# Patient Record
Sex: FEMALE | Race: BLACK OR AFRICAN AMERICAN | NOT HISPANIC OR LATINO | Employment: OTHER | URBAN - METROPOLITAN AREA
[De-identification: names, ages, dates, MRNs, and addresses within clinical notes are randomized per-mention and may not be internally consistent; named-entity substitution may affect disease eponyms.]

---

## 2017-01-20 ENCOUNTER — TELEPHONE (OUTPATIENT)
Dept: GASTROENTEROLOGY | Facility: CLINIC | Age: 67
End: 2017-01-20

## 2017-01-20 NOTE — TELEPHONE ENCOUNTER
----- Message from Katherine Munoz sent at 1/19/2017  2:10 PM CST -----  Contact: Self   Patient calling per recall letter to have procedure scheduled. Please call patient back at [phone]. Thank you!

## 2017-01-25 ENCOUNTER — TELEPHONE (OUTPATIENT)
Dept: GASTROENTEROLOGY | Facility: CLINIC | Age: 67
End: 2017-01-25

## 2017-01-25 NOTE — TELEPHONE ENCOUNTER
----- Message from Sophie Arreola sent at 1/25/2017  9:23 AM CST -----  Patient is requesting to schedule her EGD and colonoscopy contact patient [phone]    Rebel y

## 2017-01-25 NOTE — TELEPHONE ENCOUNTER
S/w pt she is having difficulty swallowing. She is asking for an EGD to be dilated. Pt has been set up for 2/9. Reviewed instructions with pt. She is aware I will be mailing instructions and confirmation letter.

## 2017-02-07 RX ORDER — CHOLECALCIFEROL (VITAMIN D3) 25 MCG
185 TABLET ORAL DAILY
COMMUNITY

## 2017-02-09 ENCOUNTER — ANESTHESIA EVENT (OUTPATIENT)
Dept: ENDOSCOPY | Facility: HOSPITAL | Age: 67
End: 2017-02-09
Payer: MEDICARE

## 2017-02-09 ENCOUNTER — SURGERY (OUTPATIENT)
Age: 67
End: 2017-02-09

## 2017-02-09 ENCOUNTER — HOSPITAL ENCOUNTER (OUTPATIENT)
Facility: HOSPITAL | Age: 67
Discharge: HOME | End: 2017-02-09
Attending: INTERNAL MEDICINE | Admitting: INTERNAL MEDICINE
Payer: MEDICARE

## 2017-02-09 ENCOUNTER — ANESTHESIA (OUTPATIENT)
Dept: ENDOSCOPY | Facility: HOSPITAL | Age: 67
End: 2017-02-09
Payer: MEDICARE

## 2017-02-09 VITALS
DIASTOLIC BLOOD PRESSURE: 69 MMHG | SYSTOLIC BLOOD PRESSURE: 136 MMHG | WEIGHT: 178 LBS | RESPIRATION RATE: 16 BRPM | TEMPERATURE: 98 F | BODY MASS INDEX: 28.61 KG/M2 | HEIGHT: 66 IN | HEART RATE: 66 BPM | OXYGEN SATURATION: 97 %

## 2017-02-09 VITALS — RESPIRATION RATE: 14 BRPM

## 2017-02-09 DIAGNOSIS — R13.10 DYSPHAGIA: ICD-10-CM

## 2017-02-09 LAB — GLUCOSE SERPL-MCNC: 132 MG/DL (ref 70–110)

## 2017-02-09 PROCEDURE — 82962 GLUCOSE BLOOD TEST: CPT | Mod: PO | Performed by: INTERNAL MEDICINE

## 2017-02-09 PROCEDURE — 43235 EGD DIAGNOSTIC BRUSH WASH: CPT | Mod: PO | Performed by: INTERNAL MEDICINE

## 2017-02-09 PROCEDURE — 25000003 PHARM REV CODE 250: Mod: PO | Performed by: NURSE ANESTHETIST, CERTIFIED REGISTERED

## 2017-02-09 PROCEDURE — 37000008 HC ANESTHESIA 1ST 15 MINUTES: Mod: PO | Performed by: INTERNAL MEDICINE

## 2017-02-09 PROCEDURE — D9220A PRA ANESTHESIA: Mod: ANES,,, | Performed by: ANESTHESIOLOGY

## 2017-02-09 PROCEDURE — 43248 EGD GUIDE WIRE INSERTION: CPT | Mod: ,,, | Performed by: INTERNAL MEDICINE

## 2017-02-09 PROCEDURE — D9220A PRA ANESTHESIA: Mod: CRNA,,, | Performed by: NURSE ANESTHETIST, CERTIFIED REGISTERED

## 2017-02-09 PROCEDURE — 37000009 HC ANESTHESIA EA ADD 15 MINS: Mod: PO | Performed by: INTERNAL MEDICINE

## 2017-02-09 PROCEDURE — 63600175 PHARM REV CODE 636 W HCPCS: Mod: PO | Performed by: NURSE ANESTHETIST, CERTIFIED REGISTERED

## 2017-02-09 PROCEDURE — 43248 EGD GUIDE WIRE INSERTION: CPT | Mod: PO | Performed by: INTERNAL MEDICINE

## 2017-02-09 PROCEDURE — 25000003 PHARM REV CODE 250: Mod: PO | Performed by: INTERNAL MEDICINE

## 2017-02-09 RX ORDER — LIDOCAINE HCL/PF 100 MG/5ML
SYRINGE (ML) INTRAVENOUS
Status: DISCONTINUED | OUTPATIENT
Start: 2017-02-09 | End: 2017-02-09

## 2017-02-09 RX ORDER — SODIUM CHLORIDE 0.9 % (FLUSH) 0.9 %
3 SYRINGE (ML) INJECTION
Status: DISCONTINUED | OUTPATIENT
Start: 2017-02-09 | End: 2017-02-09 | Stop reason: HOSPADM

## 2017-02-09 RX ORDER — PROPOFOL 10 MG/ML
VIAL (ML) INTRAVENOUS
Status: DISCONTINUED | OUTPATIENT
Start: 2017-02-09 | End: 2017-02-09

## 2017-02-09 RX ORDER — SODIUM CHLORIDE, SODIUM LACTATE, POTASSIUM CHLORIDE, CALCIUM CHLORIDE 600; 310; 30; 20 MG/100ML; MG/100ML; MG/100ML; MG/100ML
INJECTION, SOLUTION INTRAVENOUS CONTINUOUS
Status: DISCONTINUED | OUTPATIENT
Start: 2017-02-09 | End: 2017-02-09 | Stop reason: HOSPADM

## 2017-02-09 RX ADMIN — PROPOFOL 25 MG: 10 INJECTION, EMULSION INTRAVENOUS at 09:02

## 2017-02-09 RX ADMIN — SODIUM CHLORIDE, SODIUM LACTATE, POTASSIUM CHLORIDE, AND CALCIUM CHLORIDE: .6; .31; .03; .02 INJECTION, SOLUTION INTRAVENOUS at 09:02

## 2017-02-09 RX ADMIN — PROPOFOL 75 MG: 10 INJECTION, EMULSION INTRAVENOUS at 09:02

## 2017-02-09 RX ADMIN — LIDOCAINE HYDROCHLORIDE 100 MG: 20 INJECTION, SOLUTION INTRAVENOUS at 09:02

## 2017-02-09 NOTE — PLAN OF CARE
Problem: Patient Care Overview  Goal: Plan of Care Review  Outcome: Outcome(s) achieved Date Met:  02/09/17  .Vss, chloe po fluids, adequate pain control, ambulates easily.  States ready to go home.  Discharged from facility with family.

## 2017-02-09 NOTE — DISCHARGE INSTRUCTIONS
Procedural Sedation (Adult)  You have been given medicine by vein to make you sleep during your surgery. This may have included both a pain medicine and sleeping medicine. Most of the effects have worn off. But you may still have some drowsiness for the next 6 to 8 hours.  Home care  Follow these guidelines when you get home:  · For the next 8 hours, you should be watched by a responsible adult. This person should make sure your condition is not getting worse.  · Don't take any medicine by mouth for pain or for sleep during the next 4 hours. These might react with the medicines you were given in the hospital. This could cause a much stronger response than usual.  · Don't drink any alcohol for the next 24 hours.  · Don't drive, operate dangerous machinery, or make important business or personal decisions during the next 24 hours.  Follow-up care  Follow up with your healthcare provider if you are not alert and back to your usual level of activity within 12 hours.  When to seek medical advice  Call your healthcare provider right away if any of these occur:  · Drowsiness gets worse  · Weakness or dizziness gets worse  · Repeated vomiting  · You cannot be awakened   Date Last Reviewed: 10/18/2016  © 0190-0705 [employer]. [address] [place] [address]. All rights reserved. This information is not intended as a substitute for professional medical care. Always follow your healthcare professional's instructions.            Esophageal Dilation     A balloon dilator may be used to widen a stricture in the esophagus.   An esophageal dilation is a procedure used to widen a narrowed section of your esophagus. This is the tube that leads from your throat to your stomach. Narrowing (stricture) of the esophagus can cause problems. These include trouble swallowing. This sheet explains what to expect with esophageal dilation.  Why esophageal dilation is needed  Several problems can be treated  with esophageal dilation. They include:  · Peptic stricture. This is caused by reflux esophagitis. With this problem, the esophagus is irritated by acid reflux (heartburn). This occurs when acid from your stomach flows back up into the esophagus. Stomach acid damages the lining of the esophagus. This leads to a buildup of scar tissue. As a result, the esophagus is narrowed.  · Schatzkis ring. This is an abnormal ring of tissue. It forms where the esophagus meets the stomach. It can cause trouble swallowing. It can also cause food to get stuck in the esophagus. The cause of this condition is not known.  · Achalasia. This condition stops food and liquids from moving into your stomach from the esophagus. It affects the lower esophageal sphincter (LES). The LES is a muscular ring that opens (relaxes) when you swallow. With achalasia, the LES does not relax. This condition can also cause problems with peristalsis. This is the normal muscular action of the esophagus that moves food into the stomach.  · Eosinophilic esophagitis. This is a redness and swelling (inflammation) in the esophagus. It is caused by an environmental trigger such as a food allergy. It can lead to pain, trouble swallowing, and strictures.  · Less common causes of stricture. Other causes of stricture include radiation treatment and cancer.  Before you have esophageal dilation  · Tell your provider about any medicines you take. This includes prescription medicines, over-the-counter medicines, herbs, vitamins, and other supplements. Be sure to mention aspirin or any blood thinners youre taking.  · Let your provider know if you need to take antibiotics before dental procedures. You may need to take them before esophageal dilation as well.  · Tell your provider about any health conditions you have, such as heart or lung disease. Also mention any allergies to medicines.  · Youll need to have an empty stomach for the procedure. Follow your providers  instructions for not eating and drinking before the procedure.  · Arrange to have a family member or friend drive you home after the procedure.  During the procedure  · You may be given local anesthesia to numb your throat. Youll also likely be given medicine to relax you. The procedure takes about 15 minutes. It does not cause trouble breathing.  · A tube called an endoscope (scope) is used. This is a narrow tube with a tiny light and camera at the end. The scope is inserted through your mouth and into your esophagus. It lets your provider see inside your esophagus. To help guide your provider, an imaging method called fluoroscopy may also be used. This creates a moving X-ray image on a computer screen.   · Next, special tiny tools are carefully guided through your mouth and down into the esophagus. They widen the stricture and are then removed. Different types of instruments are used. The type used depends on the size and cause of the stricture. Types include:  ¨ Balloon dilator. A tiny empty balloon is put into the stricture using an endoscope. The balloon is slowly filled with air. The air is removed from the balloon when the stricture is widened to the right size. Balloon dilators are used to treat many types of strictures.  ¨ Guided wire dilator. A thin wire is eased down the esophagus. A small tube thats wider on one end is guided down the wire. It is put into the stricture to stretch it. These dilators are used to treat all kinds of strictures.  ¨ Bougies. These are weighted, cone-shaped tubes. Starting with smaller cones, your provider uses increasingly larger cones until the stricture is stretched the right amount. Bougies are often used to treat strictures that are simple (short, straight, and not very narrow).  After the procedure  · Youll be watched closely until your provider says youre ready to go home. Youll need to have a friend or family member drive you home.  · You may have a sore throat for  the rest of the day.  · You may have pain behind your breastbone for a short time afterwards.  · You can start drinking fluids again after the numbness in your throat goes away. You can resume eating the same day or the next day.  Risks and possible complications  Risks and possible complications for esophageal dilation include:  · Infection  · A tear or hole in the esophagus lining, causing bleeding and possibly needing surgery to fix  · Risks of anesthesia  Follow-up  You may need to have the procedure repeated one or more times. This depends on the cause and extent of the narrowing. Repeat procedures can allow the dilation to take place more slowly. This reduces the risks of the procedure.  If your stricture was caused by reflux esophagitis, youll likely need to take medicine to treat that condition. Your provider will tell you more.  When to call your provider  Call your healthcare provider right away if you have any of the following after the procedure:  · Fever of 100.4°F (38.0°C)  · Chest pain  · Trouble swallowing  · Vomiting blood or material that looks like coffee grounds  · Bleeding  · Black, tarry, or bloody stools   Date Last Reviewed: 7/1/2016  © 8733-2307 [employer]. [address] [place] [address], [place] [address]. All rights reserved. This information is not intended as a substitute for professional medical care. Always follow your healthcare professional's instructions.

## 2017-02-09 NOTE — ANESTHESIA POSTPROCEDURE EVALUATION
"Anesthesia Post Evaluation    Patient: Estela Pardo    Procedure(s) Performed: Procedure(s) (LRB):  ESOPHAGOGASTRODUODENOSCOPY (EGD) (N/A)    Final Anesthesia Type: general  Patient location during evaluation: PACU  Patient participation: Yes- Able to Participate  Level of consciousness: awake and alert and oriented  Post-procedure vital signs: reviewed and stable  Pain management: adequate  Airway patency: patent  PONV status at discharge: No PONV  Anesthetic complications: no      Cardiovascular status: hemodynamically stable and blood pressure returned to baseline  Respiratory status: unassisted, spontaneous ventilation and room air  Hydration status: euvolemic  Follow-up not needed.        Visit Vitals    /72 (BP Location: Left arm, Patient Position: Lying, BP Method: Automatic)    Pulse (!) 56    Temp 36.4 °C (97.5 °F)    Resp 16    Ht 5' 6" (1.676 m)    Wt 80.7 kg (178 lb)    SpO2 (!) 94%    Breastfeeding No    BMI 28.73 kg/m2       Pain/Kathy Score: Pain Assessment Performed: Yes (2/9/2017  9:21 AM)  Presence of Pain: complains of pain/discomfort (2/9/2017  9:21 AM)  Kathy Score: 9 (2/9/2017 10:06 AM)      "

## 2017-02-09 NOTE — ANESTHESIA PREPROCEDURE EVALUATION
02/09/2017  Estela Pardo is a 66 y.o., female.    [facility] Anesthesia Evaluation      I have reviewed the Medications.   Steroids Taken In Past Year: Prednisone    Review of Systems  Anesthesia Hx:  No problems with previous Anesthesia   Social:  Non-Smoker, No Alcohol Use    Hematology/Oncology:        Hematology Comments: Sickle cell trait   EENT/Dental:   Sjogren syndrome   Cardiovascular:   Hypertension    Pulmonary:  Pulmonary Normal    Renal/:  Renal/ Normal     Hepatic/GI:  Hepatic/GI Normal    Musculoskeletal:   Arthritis (rheumatoid arthritis)     Neurological:  Neurology Normal    Endocrine:   Diabetes        Physical Exam  General:  Well nourished    Airway/Jaw/Neck:  Airway Findings: Mouth Opening: Normal General Airway Assessment: Adult  Mallampati: II  Jaw/Neck Findings:  Neck ROM: Extension Decreased, Mild       Chest/Lungs:  Chest/Lungs Findings: Clear to auscultation, Normal Respiratory Rate     Heart/Vascular:  Heart Findings: Rate: Normal  Rhythm: Regular Rhythm  Sounds: Normal  Heart murmur: negative Vascular Findings: Normal (No carotid bruits.)       Mental Status:  Mental Status Findings:  Cooperative, Alert and Oriented         Anesthesia Plan  Type of Anesthesia, risks & benefits discussed:  Anesthesia Type:  general  Patient's Preference:   Intra-op Monitoring Plan:   Intra-op Monitoring Plan Comments:   Post Op Pain Control Plan:   Post Op Pain Control Plan Comments:   Induction:   IV  Beta Blocker:  Patient is not currently on a Beta-Blocker (No further documentation required).       Informed Consent: Patient understands risks and agrees with Anesthesia plan.  Questions answered. Anesthesia consent signed with patient.  ASA Score: 3     Day of Surgery Review of History & Physical:        Anesthesia Plan Notes: Propofol general.        Ready For Surgery From Anesthesia Perspective.

## 2017-02-09 NOTE — H&P
History & Physical - Short Stay  Gastroenterology      SUBJECTIVE:     Procedure: EGD    Chief Complaint/Indication for Procedure: Dysphagia    PTA Medications   Medication Sig    abatacept (ORENCIA) 125 mg/mL Syrg Inject 125 mg into the skin every 7 days.    aspirin 81 mg Tab Take 1 tablet by mouth once daily. Every day    bifidobacterium infantis (ALIGN) 4 mg Cap Take by mouth.    biotin 300 mcg Tab Take 1 tablet by mouth once daily.      calcium citrate-vitamin D3 315-200 mg (CITRACAL+D) 315-200 mg-unit per tablet Take 1 tablet by mouth 2 (two) times daily.      clonidine (CATAPRES) 0.1 MG tablet Take 0.1 mg by mouth 2 (two) times daily.    dexlansoprazole (DEXILANT) 60 mg capsule Take 1 capsule by mouth Daily.    diltiazem (CARDIZEM LA) 360 mg 24 hr tablet Take 360 mg by mouth once daily. Every day    duloxetine (CYMBALTA) 60 MG capsule Take 60 mg by mouth once daily.      fish oil-omega-3 fatty acids 300-1,000 mg capsule Take 2 g by mouth once daily.      gabapentin (NEURONTIN) 300 MG capsule Take 600 mg by mouth 2 (two) times daily. B.I.D. and two at bedtime    garlic 1 mg Cap Take 1 tablet by mouth once daily.     hydrALAZINE (APRESOLINE) 25 MG tablet Take 25 mg by mouth 3 (three) times daily.    levothyroxine (SYNTHROID) 50 MCG tablet Take 50 mcg by mouth once daily.      metformin (GLUCOPHAGE) 500 MG tablet Take 500 mg by mouth daily with breakfast.     oxycodone-acetaminophen (PERCOCET)  mg per tablet Take 1 tablet by mouth daily as needed for Pain.    predniSONE (DELTASONE) 5 MG tablet 5 mg. Every day    sucralfate (CARAFATE) 100 mg/mL suspension Take 1 g by mouth 4 (four) times daily with meals and nightly.    valsartan-hydrochlorothiazide (DIOVAN-HCT) 160-25 mg per tablet Take 1 tablet by mouth once daily.      vitamin D 1000 units Tab Take 185 mg by mouth once daily.       Review of patient's allergies indicates:   Allergen Reactions    Methotrexate Other (See Comments)      Other reaction(s): abscess under arms/ GI discomfort    Nsaids (non-steroidal anti-inflammatory drug) Other (See Comments)     GI upset        Past Medical History   Diagnosis Date    Anemia      history of sickle cell trait    Anticoagulant long-term use     Arthritis      rheumatoid    Chronic constipation     Colon polyp     Diabetes mellitus     Esophageal stricture     Former smoker     Hypertension     RA (rheumatoid arthritis)     Sjoegren syndrome     Thyroid disease      Past Surgical History   Procedure Laterality Date    Toe surgery       pin little toe right foot    Upper gastrointestinal endoscopy  2015    Colonoscopy  4/9/2012     Dr. Lindsey at [facility] [place] [facility]; internal hemorrhoids, colon polyp removed, repeat in 5 years for surveillance or prn    Tubal ligation      Colonoscopy N/A 2/17/2016     Procedure: COLONOSCOPY;  Surgeon: Bartolo Lindsey MD;  Location: [facility];  Service: Endoscopy;  Laterality: N/A;    Joint replacement       right knee replacement    Joint replacement       Left knee replacement     Eye surgery       cataract      Family History   Problem Relation Age of Onset    Liver cancer Sister      Social History   Substance Use Topics    Smoking status: Former Smoker     Packs/day: 0.20     Years: 10.00     Types: Cigarettes     Quit date: 5/20/1985    Smokeless tobacco: Never Used    Alcohol use No         OBJECTIVE:     Vital Signs (Most Recent)  Temp: 97.9 °F (36.6 °C) (02/09/17 0920)  Pulse: 60 (02/09/17 0920)  Resp: 16 (02/09/17 0920)  BP: (!) 150/68 (02/09/17 0920)  SpO2: 95 % (02/09/17 0920)    Physical Exam:                                                       GENERAL:  Comfortable, in no acute distress.                                 HEENT EXAM:  Nonicteric.  No adenopathy.  Oropharynx is clear.               NECK:  Supple.                                                               LUNGS:  Clear.                                                                CARDIAC:  Regular rate and rhythm.  S1, S2.  No murmur.                      ABDOMEN:  Soft, positive bowel sounds, nontender.  No hepatosplenomegaly or masses.  No rebound or guarding.                                             EXTREMITIES:  No edema.     MENTAL STATUS:  Normal, alert and oriented.      ASSESSMENT/PLAN:     Assessment: Dysphagia    Plan: EGD    Anesthesia Plan: General    ASA Grade: ASA 2 - Patient with mild systemic disease with no functional limitations    MALLAMPATI SCORE:  I (soft palate, uvula, fauces, and tonsillar pillars visible)

## 2017-02-09 NOTE — TRANSFER OF CARE
"Anesthesia Transfer of Care Note    Patient: Estela Pardo    Procedure(s) Performed: Procedure(s) (LRB):  ESOPHAGOGASTRODUODENOSCOPY (EGD) (N/A)    Patient location: PACU    Anesthesia Type: general    Transport from OR: Transported from OR on room air with adequate spontaneous ventilation    Post pain: adequate analgesia    Post assessment: no apparent anesthetic complications and tolerated procedure well    Post vital signs: stable    Level of consciousness: awake and alert    Nausea/Vomiting: no nausea/vomiting    Complications: none          Last vitals:   Visit Vitals    BP (!) 150/68 (BP Location: Right arm, Patient Position: Lying, BP Method: Automatic)    Pulse 60    Temp 36.6 °C (97.9 °F) (Skin)    Resp 16    Ht 5' 6" (1.676 m)    Wt 80.7 kg (178 lb)    SpO2 95%    Breastfeeding No    BMI 28.73 kg/m2     "

## 2017-02-09 NOTE — IP AVS SNAPSHOT
[facility]  [address] [facility] [address]  [place] [address]  Phone: [phone]           Patient Discharge Instructions     Our goal is to set you up for success. This packet includes information on your condition, medications, and your home care. It will help you to care for yourself so you don't get sicker and need to go back to the hospital.     Please ask your nurse if you have any questions.        There are many details to remember when preparing to leave the hospital. Here is what you will need to do:    1. Take your medicine. If you are prescribed medications, review your Medication List in the following pages. You may have new medications to  at the pharmacy and others that you'll need to stop taking. Review the instructions for how and when to take your medications. Talk with your doctor or nurses if you are unsure of what to do.     2. Go to your follow-up appointments. Specific follow-up information is listed in the following pages. Your may be contacted by a transition nurse or clinical provider about future appointments. Be sure we have all of the phone numbers to reach you, if needed. Please contact your provider's office if you are unable to make an appointment.     3. Watch for warning signs. Your doctor or nurse will give you detailed warning signs to watch for and when to call for assistance. These instructions may also include educational information about your condition. If you experience any of warning signs to your health, call your doctor.               [facility]  Unless otherwise directed by your provider, please contact [facility], our nurse care line that is available for 24/7 assistance.     [phone] (toll-free)    Registered nurses in the [facility] provide clinical advisement, health education, appointment booking, and other advisory services.                    ** Verify the list of medication(s) below is accurate and up  to date. Carry this with you in case of emergency. If your medications have changed, please notify your healthcare provider.             Medication List      CONTINUE taking these medications        Additional Info                      ALIGN 4 mg Cap   Refills:  0   Generic drug:  Bifidobacterium infantis    Instructions:  Take by mouth.     Begin Date    AM    Noon    PM    Bedtime       aspirin 81 mg Tab   Refills:  0   Dose:  1 tablet    Instructions:  Take 1 tablet by mouth once daily. Every day     Begin Date    AM    Noon    PM    Bedtime       biotin 300 mcg Tab   Refills:  0   Dose:  1 tablet    Instructions:  Take 1 tablet by mouth once daily.     Begin Date    AM    Noon    PM    Bedtime       calcium citrate-vitamin D3 315-200 mg 315-200 mg-unit per tablet   Commonly known as:  CITRACAL+D   Refills:  0   Dose:  1 tablet    Instructions:  Take 1 tablet by mouth 2 (two) times daily.     Begin Date    AM    Noon    PM    Bedtime       CARDIZEM  mg 24 hr tablet   Refills:  0   Dose:  360 mg   Generic drug:  diltiaZEM    Instructions:  Take 360 mg by mouth once daily. Every day     Begin Date    AM    Noon    PM    Bedtime       cloNIDine 0.1 MG tablet   Commonly known as:  CATAPRES   Refills:  0   Dose:  0.1 mg    Instructions:  Take 0.1 mg by mouth 2 (two) times daily.     Begin Date    AM    Noon    PM    Bedtime       DEXILANT 60 mg capsule   Refills:  0   Dose:  1 capsule   Generic drug:  dexlansoprazole    Instructions:  Take 1 capsule by mouth Daily.     Begin Date    AM    Noon    PM    Bedtime       duloxetine 60 MG capsule   Commonly known as:  CYMBALTA   Refills:  0   Dose:  60 mg    Instructions:  Take 60 mg by mouth once daily.     Begin Date    AM    Noon    PM    Bedtime       fish oil-omega-3 fatty acids 300-1,000 mg capsule   Refills:  0   Dose:  2 g    Instructions:  Take 2 g by mouth once daily.     Begin Date    AM    Noon    PM    Bedtime       garlic 1 mg Cap   Refills:  0   Dose:   1 tablet    Instructions:  Take 1 tablet by mouth once daily.     Begin Date    AM    Noon    PM    Bedtime       hydrALAZINE 25 MG tablet   Commonly known as:  APRESOLINE   Refills:  0   Dose:  25 mg    Instructions:  Take 25 mg by mouth 3 (three) times daily.     Begin Date    AM    Noon    PM    Bedtime       levothyroxine 50 MCG tablet   Commonly known as:  SYNTHROID   Refills:  0   Dose:  50 mcg    Instructions:  Take 50 mcg by mouth once daily.     Begin Date    AM    Noon    PM    Bedtime       metformin 500 MG tablet   Commonly known as:  GLUCOPHAGE   Refills:  0   Dose:  500 mg    Instructions:  Take 500 mg by mouth daily with breakfast.     Begin Date    AM    Noon    PM    Bedtime       NEURONTIN 300 MG capsule   Refills:  0   Dose:  600 mg   Generic drug:  gabapentin    Instructions:  Take 600 mg by mouth 2 (two) times daily. B.I.D. and two at bedtime     Begin Date    AM    Noon    PM    Bedtime       ORENCIA 125 mg/mL Syrg   Refills:  0   Dose:  125 mg   Indications:  Rheumatoid Arthritis   Generic drug:  abatacept    Instructions:  Inject 125 mg into the skin every 7 days.     Begin Date    AM    Noon    PM    Bedtime       oxycodone-acetaminophen  mg per tablet   Commonly known as:  PERCOCET   Refills:  0   Dose:  1 tablet    Instructions:  Take 1 tablet by mouth daily as needed for Pain.     Begin Date    AM    Noon    PM    Bedtime       predniSONE 5 MG tablet   Commonly known as:  DELTASONE   Refills:  0   Dose:  5 mg    Instructions:  5 mg. Every day     Begin Date    AM    Noon    PM    Bedtime       sucralfate 100 mg/mL suspension   Commonly known as:  CARAFATE   Refills:  0   Dose:  1 g    Instructions:  Take 1 g by mouth 4 (four) times daily with meals and nightly.     Begin Date    AM    Noon    PM    Bedtime       valsartan-hydrochlorothiazide 160-25 mg per tablet   Commonly known as:  DIOVAN-HCT   Refills:  0   Dose:  1 tablet    Instructions:  Take 1 tablet by mouth once daily.      Begin Date    AM    Noon    PM    Bedtime       vitamin D 1000 units Tab   Refills:  0   Dose:  185 mg    Instructions:  Take 185 mg by mouth once daily.     Begin Date    AM    Noon    PM    Bedtime                  Please bring to all follow up appointments:    1. A copy of your discharge instructions.  2. All medicines you are currently taking in their original bottles.  3. Identification and insurance card.    Please arrive 15 minutes ahead of scheduled appointment time.    Please call 24 hours in advance if you must reschedule your appointment and/or time.        Follow-up Information     Follow up with Suyapa Rasmussen MD.    Specialty:  Internal Medicine    Contact information:    [address]  [address]  [facility] [address]  [phone]            Discharge Instructions           Procedural Sedation (Adult)  You have been given medicine by vein to make you sleep during your surgery. This may have included both a pain medicine and sleeping medicine. Most of the effects have worn off. But you may still have some drowsiness for the next 6 to 8 hours.  Home care  Follow these guidelines when you get home:  · For the next 8 hours, you should be watched by a responsible adult. This person should make sure your condition is not getting worse.  · Don't take any medicine by mouth for pain or for sleep during the next 4 hours. These might react with the medicines you were given in the hospital. This could cause a much stronger response than usual.  · Don't drink any alcohol for the next 24 hours.  · Don't drive, operate dangerous machinery, or make important business or personal decisions during the next 24 hours.  Follow-up care  Follow up with your healthcare provider if you are not alert and back to your usual level of activity within 12 hours.  When to seek medical advice  Call your healthcare provider right away if any of these occur:  · Drowsiness gets worse  · Weakness or dizziness gets worse  · Repeated  vomiting  · You cannot be awakened   Date Last Reviewed: 10/18/2016  © 2853-2821 [employer]. [address] [place] [address], [place] [address]. All rights reserved. This information is not intended as a substitute for professional medical care. Always follow your healthcare professional's instructions.            Esophageal Dilation     A balloon dilator may be used to widen a stricture in the esophagus.   An esophageal dilation is a procedure used to widen a narrowed section of your esophagus. This is the tube that leads from your throat to your stomach. Narrowing (stricture) of the esophagus can cause problems. These include trouble swallowing. This sheet explains what to expect with esophageal dilation.  Why esophageal dilation is needed  Several problems can be treated with esophageal dilation. They include:  · Peptic stricture. This is caused by reflux esophagitis. With this problem, the esophagus is irritated by acid reflux (heartburn). This occurs when acid from your stomach flows back up into the esophagus. Stomach acid damages the lining of the esophagus. This leads to a buildup of scar tissue. As a result, the esophagus is narrowed.  · Schatzkis ring. This is an abnormal ring of tissue. It forms where the esophagus meets the stomach. It can cause trouble swallowing. It can also cause food to get stuck in the esophagus. The cause of this condition is not known.  · Achalasia. This condition stops food and liquids from moving into your stomach from the esophagus. It affects the lower esophageal sphincter (LES). The LES is a muscular ring that opens (relaxes) when you swallow. With achalasia, the LES does not relax. This condition can also cause problems with peristalsis. This is the normal muscular action of the esophagus that moves food into the stomach.  · Eosinophilic esophagitis. This is a redness and swelling (inflammation) in the esophagus. It is caused by an environmental trigger such as a food  allergy. It can lead to pain, trouble swallowing, and strictures.  · Less common causes of stricture. Other causes of stricture include radiation treatment and cancer.  Before you have esophageal dilation  · Tell your provider about any medicines you take. This includes prescription medicines, over-the-counter medicines, herbs, vitamins, and other supplements. Be sure to mention aspirin or any blood thinners youre taking.  · Let your provider know if you need to take antibiotics before dental procedures. You may need to take them before esophageal dilation as well.  · Tell your provider about any health conditions you have, such as heart or lung disease. Also mention any allergies to medicines.  · Youll need to have an empty stomach for the procedure. Follow your providers instructions for not eating and drinking before the procedure.  · Arrange to have a family member or friend drive you home after the procedure.  During the procedure  · You may be given local anesthesia to numb your throat. Youll also likely be given medicine to relax you. The procedure takes about 15 minutes. It does not cause trouble breathing.  · A tube called an endoscope (scope) is used. This is a narrow tube with a tiny light and camera at the end. The scope is inserted through your mouth and into your esophagus. It lets your provider see inside your esophagus. To help guide your provider, an imaging method called fluoroscopy may also be used. This creates a moving X-ray image on a computer screen.   · Next, special tiny tools are carefully guided through your mouth and down into the esophagus. They widen the stricture and are then removed. Different types of instruments are used. The type used depends on the size and cause of the stricture. Types include:  ¨ Balloon dilator. A tiny empty balloon is put into the stricture using an endoscope. The balloon is slowly filled with air. The air is removed from the balloon when the stricture is  widened to the right size. Balloon dilators are used to treat many types of strictures.  ¨ Guided wire dilator. A thin wire is eased down the esophagus. A small tube thats wider on one end is guided down the wire. It is put into the stricture to stretch it. These dilators are used to treat all kinds of strictures.  ¨ Bougies. These are weighted, cone-shaped tubes. Starting with smaller cones, your provider uses increasingly larger cones until the stricture is stretched the right amount. Bougies are often used to treat strictures that are simple (short, straight, and not very narrow).  After the procedure  · Youll be watched closely until your provider says youre ready to go home. Youll need to have a friend or family member drive you home.  · You may have a sore throat for the rest of the day.  · You may have pain behind your breastbone for a short time afterwards.  · You can start drinking fluids again after the numbness in your throat goes away. You can resume eating the same day or the next day.  Risks and possible complications  Risks and possible complications for esophageal dilation include:  · Infection  · A tear or hole in the esophagus lining, causing bleeding and possibly needing surgery to fix  · Risks of anesthesia  Follow-up  You may need to have the procedure repeated one or more times. This depends on the cause and extent of the narrowing. Repeat procedures can allow the dilation to take place more slowly. This reduces the risks of the procedure.  If your stricture was caused by reflux esophagitis, youll likely need to take medicine to treat that condition. Your provider will tell you more.  When to call your provider  Call your healthcare provider right away if you have any of the following after the procedure:  · Fever of 100.4°F (38.0°C)  · Chest pain  · Trouble swallowing  · Vomiting blood or material that looks like coffee grounds  · Bleeding  · Black, tarry, or bloody stools   Date Last  "Reviewed: 7/1/2016  © 9679-7129 [employer]. [address] [place] [address], [place] [address]. All rights reserved. This information is not intended as a substitute for professional medical care. Always follow your healthcare professional's instructions.            Admission Information     Date & Time Provider Department CSN    2/9/2017  8:07 AM Bartolo Lindsey MD [facility] [id number]      Care Providers     Provider Role Specialty Primary office phone    Bartolo Lindsey MD Attending Provider Gastroenterology [phone]    Bartolo Lindsey MD Surgeon  Gastroenterology [phone]      Your Vitals Were     BP Pulse Temp Resp Height Weight    136/72 (BP Location: Left arm, Patient Position: Lying, BP Method: Automatic) 56 97.5 °F (36.4 °C) 16 5' 6" (1.676 m) 80.7 kg (178 lb)    SpO2 BMI             94% 28.73 kg/m2         Recent Lab Values     No lab values to display.      Allergies as of 2/9/2017        Reactions    Methotrexate Other (See Comments)    Other reaction(s): abscess under arms/ GI discomfort    Nsaids (Non-steroidal Anti-inflammatory Drug) Other (See Comments)    GI upset      Advance Directives     An advance directive is a document which, in the event you are no longer able to make decisions for yourself, tells your healthcare team what kind of treatment you do or do not want to receive, or who you would like to make those decisions for you.  If you do not currently have an advance directive, [facility] encourages you to create one.  For more information call:  [phone]), [phone] ([phone]),  or log on to [email or web address].        Smoking Cessation     If you would like to quit smoking:   You may be eligible for free services if you are a [place] resident and started smoking cigarettes before September 1, 1988.  Call the Smoking Cessation Trust (SCT) toll free at (425) 042-1706 or (161) 303-3649.   Call 1-800-QUIT-NOW if you " do not meet the above criteria.            Language Assistance Services     ATTENTION: Language assistance services are available, free of charge. Please call [phone].      ATENCIÓN: Si matilde damon, tiene a rivera disposición servicios gratuitos de asistencia lingüística. Jemma al [phone].     CHÚ Ý: N?u b?n nói Ti?ng Vi?t, có các d?ch v? h? tr? ngôn ng? mi?n phí dành cho b?n. G?i s? [phone].        [facility] Sign-Up     Activating your [facility] account is as easy as 1-2-3!     1) Visit [email or web address], select Sign Up Now, enter this activation code and your date of birth, then select Next.  [email or web address]  Expires: 3/26/2017 10:15 AM      2) Create a username and password to use when you visit [facility] in the future and select a security question in case you lose your password and select Next.    3) Enter your e-mail address and click Sign Up!    Additional Information  If you have questions, please e-mail [email or web address] or call [phone] to talk to our [facility] staff. Remember, [facility] is NOT to be used for urgent needs. For medical emergencies, dial 911.          [facility] complies with applicable Federal civil rights laws and does not discriminate on the basis of race, color, national origin, age, disability, or sex.

## 2017-02-09 NOTE — DISCHARGE SUMMARY
Discharge Note  Short Stay      SUMMARY     Admit Date: 2/9/2017    Attending Physician: Bartolo Lindsey MD     Discharge Physician: Bartolo Lindsey MD    Discharge Date: 2/9/2017 9:54 AM    Final Diagnosis: Dysphagia, unspecified type [R13.10]    Disposition: HOME OR SELF CARE    Patient Instructions:   Current Discharge Medication List      CONTINUE these medications which have NOT CHANGED    Details   abatacept (ORENCIA) 125 mg/mL Syrg Inject 125 mg into the skin every 7 days.      aspirin 81 mg Tab Take 1 tablet by mouth once daily. Every day      bifidobacterium infantis (ALIGN) 4 mg Cap Take by mouth.      biotin 300 mcg Tab Take 1 tablet by mouth once daily.        calcium citrate-vitamin D3 315-200 mg (CITRACAL+D) 315-200 mg-unit per tablet Take 1 tablet by mouth 2 (two) times daily.        clonidine (CATAPRES) 0.1 MG tablet Take 0.1 mg by mouth 2 (two) times daily.      dexlansoprazole (DEXILANT) 60 mg capsule Take 1 capsule by mouth Daily.      diltiazem (CARDIZEM LA) 360 mg 24 hr tablet Take 360 mg by mouth once daily. Every day      duloxetine (CYMBALTA) 60 MG capsule Take 60 mg by mouth once daily.        fish oil-omega-3 fatty acids 300-1,000 mg capsule Take 2 g by mouth once daily.        gabapentin (NEURONTIN) 300 MG capsule Take 600 mg by mouth 2 (two) times daily. B.I.D. and two at bedtime      garlic 1 mg Cap Take 1 tablet by mouth once daily.       hydrALAZINE (APRESOLINE) 25 MG tablet Take 25 mg by mouth 3 (three) times daily.      levothyroxine (SYNTHROID) 50 MCG tablet Take 50 mcg by mouth once daily.        metformin (GLUCOPHAGE) 500 MG tablet Take 500 mg by mouth daily with breakfast.       oxycodone-acetaminophen (PERCOCET)  mg per tablet Take 1 tablet by mouth daily as needed for Pain.      predniSONE (DELTASONE) 5 MG tablet 5 mg. Every day      sucralfate (CARAFATE) 100 mg/mL suspension Take 1 g by mouth 4 (four) times daily with meals and nightly.       valsartan-hydrochlorothiazide (DIOVAN-HCT) 160-25 mg per tablet Take 1 tablet by mouth once daily.        vitamin D 1000 units Tab Take 185 mg by mouth once daily.             Discharge Procedure Orders (must include Diet, Follow-up, Activity)    Follow Up:  Follow up with PCP as previously scheduled  Resume routine diet.  Activity as tolerated.    No driving day of procedure.

## 2018-02-07 ENCOUNTER — OFFICE VISIT (OUTPATIENT)
Dept: GASTROENTEROLOGY | Facility: CLINIC | Age: 68
End: 2018-02-07
Payer: MEDICARE

## 2018-02-07 VITALS
DIASTOLIC BLOOD PRESSURE: 83 MMHG | BODY MASS INDEX: 29.41 KG/M2 | HEART RATE: 92 BPM | WEIGHT: 183 LBS | SYSTOLIC BLOOD PRESSURE: 156 MMHG | HEIGHT: 66 IN

## 2018-02-07 DIAGNOSIS — R10.13 EPIGASTRIC PAIN: Primary | ICD-10-CM

## 2018-02-07 DIAGNOSIS — K21.9 GASTROESOPHAGEAL REFLUX DISEASE, ESOPHAGITIS PRESENCE NOT SPECIFIED: ICD-10-CM

## 2018-02-07 DIAGNOSIS — R13.19 ESOPHAGEAL DYSPHAGIA: ICD-10-CM

## 2018-02-07 PROCEDURE — 99213 OFFICE O/P EST LOW 20 MIN: CPT | Mod: PBBFAC,PO | Performed by: INTERNAL MEDICINE

## 2018-02-07 PROCEDURE — 99999 PR PBB SHADOW E&M-EST. PATIENT-LVL III: CPT | Mod: PBBFAC,,, | Performed by: INTERNAL MEDICINE

## 2018-02-07 PROCEDURE — 99213 OFFICE O/P EST LOW 20 MIN: CPT | Mod: ,,, | Performed by: INTERNAL MEDICINE

## 2018-02-07 NOTE — PROGRESS NOTES
"Pt presents for evaluation epigastric "burning" pain. Pain is chronic, intermittent. Pt currently doing OK. No N/V. No bleeding. No fever or jaundice. Appetite and weight stable. Positive dysphagia to solids. Hx esophageal ring s/p dilation. Taking dexilant. Denies NSAIDs. Prior GB U/S approx 5 years ago negative.    REVIEW OF SYSTEMS:   Constitutional: Negative for fever, appetite change and unexpected weight change.  HENT: Negative for sore throat and positive trouble swallowing.  Eyes: Negative for visual disturbance.  Respiratory: Negative for chest tightness, shortness of breath and wheezing.  Cardiovascular: Negative for chest pain.  Gastrointestinal:  as per HPI  Genitourinary: Negative for dysuria, frequency and hematuria.  Musculoskeletal: Negative for myalgias, joint swelling and arthralgias.  Skin: Negative for color change and rash.   Neurological: Negative for syncope, weakness and headaches.    PHYSICAL EXAMINATION:                                                        GENERAL:  Comfortable, in no acute distress.      SKIN: Non-jaundiced.                             HEENT EXAM:  Nonicteric.  No adenopathy.  Oropharynx is clear.               NECK:  Supple.                                                               LUNGS:  Clear.                                                               CARDIAC:  Regular rate and rhythm.  S1, S2.  No murmur.                      ABDOMEN:  Soft, positive bowel sounds, nontender.  No hepatosplenomegaly or masses.  No rebound or guarding.                                             EXTREMITIES:  No edema.     NEURO: CN II-XII intact; motor/sensory non-focal.    IMP: 1. Epigastric pain          2. Dysphagia          3. Hx esophageal ring          4. GERD    PLAN: EGD/dilation.    "

## 2018-02-09 ENCOUNTER — ANESTHESIA (OUTPATIENT)
Dept: ENDOSCOPY | Facility: HOSPITAL | Age: 68
End: 2018-02-09
Payer: MEDICARE

## 2018-02-09 ENCOUNTER — ANESTHESIA EVENT (OUTPATIENT)
Dept: ENDOSCOPY | Facility: HOSPITAL | Age: 68
End: 2018-02-09
Payer: MEDICARE

## 2018-02-09 ENCOUNTER — SURGERY (OUTPATIENT)
Age: 68
End: 2018-02-09

## 2018-02-09 ENCOUNTER — HOSPITAL ENCOUNTER (OUTPATIENT)
Facility: HOSPITAL | Age: 68
Discharge: HOME | End: 2018-02-09
Attending: INTERNAL MEDICINE | Admitting: INTERNAL MEDICINE
Payer: MEDICARE

## 2018-02-09 VITALS
OXYGEN SATURATION: 98 % | RESPIRATION RATE: 16 BRPM | TEMPERATURE: 98 F | HEART RATE: 66 BPM | DIASTOLIC BLOOD PRESSURE: 68 MMHG | SYSTOLIC BLOOD PRESSURE: 135 MMHG

## 2018-02-09 DIAGNOSIS — R10.13 EPIGASTRIC PAIN: ICD-10-CM

## 2018-02-09 LAB
GLUCOSE SERPL-MCNC: 152 MG/DL (ref 70–110)
H PYLORI INDEX VALUE: NEGATIVE

## 2018-02-09 PROCEDURE — 87449 NOS EACH ORGANISM AG IA: CPT | Mod: PO | Performed by: INTERNAL MEDICINE

## 2018-02-09 PROCEDURE — 43248 EGD GUIDE WIRE INSERTION: CPT | Mod: PO | Performed by: INTERNAL MEDICINE

## 2018-02-09 PROCEDURE — 88305 TISSUE EXAM BY PATHOLOGIST: CPT | Mod: 59 | Performed by: PATHOLOGY

## 2018-02-09 PROCEDURE — D9220A PRA ANESTHESIA: Mod: ANES,,, | Performed by: ANESTHESIOLOGY

## 2018-02-09 PROCEDURE — 37000008 HC ANESTHESIA 1ST 15 MINUTES: Mod: PO | Performed by: INTERNAL MEDICINE

## 2018-02-09 PROCEDURE — 88342 IMHCHEM/IMCYTCHM 1ST ANTB: CPT | Mod: 26,,, | Performed by: PATHOLOGY

## 2018-02-09 PROCEDURE — 43248 EGD GUIDE WIRE INSERTION: CPT | Mod: ,,, | Performed by: INTERNAL MEDICINE

## 2018-02-09 PROCEDURE — 63600175 PHARM REV CODE 636 W HCPCS: Mod: PO | Performed by: NURSE ANESTHETIST, CERTIFIED REGISTERED

## 2018-02-09 PROCEDURE — 43239 EGD BIOPSY SINGLE/MULTIPLE: CPT | Mod: PO | Performed by: INTERNAL MEDICINE

## 2018-02-09 PROCEDURE — 27201012 HC FORCEPS, HOT/COLD, DISP: Mod: PO | Performed by: INTERNAL MEDICINE

## 2018-02-09 PROCEDURE — D9220A PRA ANESTHESIA: Mod: CRNA,,, | Performed by: NURSE ANESTHETIST, CERTIFIED REGISTERED

## 2018-02-09 PROCEDURE — 37000009 HC ANESTHESIA EA ADD 15 MINS: Mod: PO | Performed by: INTERNAL MEDICINE

## 2018-02-09 PROCEDURE — 43239 EGD BIOPSY SINGLE/MULTIPLE: CPT | Mod: 59,,, | Performed by: INTERNAL MEDICINE

## 2018-02-09 RX ORDER — PROPOFOL 10 MG/ML
VIAL (ML) INTRAVENOUS
Status: DISCONTINUED | OUTPATIENT
Start: 2018-02-09 | End: 2018-02-09

## 2018-02-09 RX ORDER — LIDOCAINE HCL/PF 100 MG/5ML
SYRINGE (ML) INTRAVENOUS
Status: DISCONTINUED | OUTPATIENT
Start: 2018-02-09 | End: 2018-02-09

## 2018-02-09 RX ORDER — SODIUM CHLORIDE, SODIUM LACTATE, POTASSIUM CHLORIDE, CALCIUM CHLORIDE 600; 310; 30; 20 MG/100ML; MG/100ML; MG/100ML; MG/100ML
INJECTION, SOLUTION INTRAVENOUS CONTINUOUS
Status: DISCONTINUED | OUTPATIENT
Start: 2018-02-09 | End: 2018-02-09 | Stop reason: HOSPADM

## 2018-02-09 RX ADMIN — LIDOCAINE HYDROCHLORIDE 100 MG: 20 INJECTION, SOLUTION INTRAVENOUS at 12:02

## 2018-02-09 RX ADMIN — PROPOFOL 50 MG: 10 INJECTION, EMULSION INTRAVENOUS at 12:02

## 2018-02-09 RX ADMIN — PROPOFOL 100 MG: 10 INJECTION, EMULSION INTRAVENOUS at 12:02

## 2018-02-09 RX ADMIN — SODIUM CHLORIDE, SODIUM LACTATE, POTASSIUM CHLORIDE, CALCIUM CHLORIDE: 600; 310; 30; 20 INJECTION, SOLUTION INTRAVENOUS at 11:02

## 2018-02-09 NOTE — ANESTHESIA PREPROCEDURE EVALUATION
02/09/2018  Estela Pardo is a 67 y.o., female.    Anesthesia Evaluation      I have reviewed the Medications.   Steroids Taken In Past Year: Prednisone    Review of Systems  Anesthesia Hx:  No problems with previous Anesthesia   Social:  Non-Smoker, No Alcohol Use    Hematology/Oncology:        Hematology Comments: Sickle cell trait   EENT/Dental:   Sjogren syndrome   Cardiovascular:   Hypertension    Pulmonary:  Pulmonary Normal    Renal/:  Renal/ Normal     Hepatic/GI:  Hepatic/GI Normal    Musculoskeletal:   Arthritis (rheumatoid arthritis)     Neurological:   Chronic Pain Syndrome   Endocrine:   Diabetes Hypothyroidism        Physical Exam  General:  Well nourished    Airway/Jaw/Neck:  Airway Findings: Mouth Opening: Normal General Airway Assessment: Adult  Mallampati: II  Jaw/Neck Findings:  Neck ROM: Extension Decreased, Mild       Chest/Lungs:  Chest/Lungs Findings: Clear to auscultation, Normal Respiratory Rate     Heart/Vascular:  Heart Findings: Rate: Normal  Rhythm: Regular Rhythm  Sounds: Normal  Heart murmur: negative Vascular Findings: Normal (No carotid bruits.)       Mental Status:  Mental Status Findings:  Cooperative, Alert and Oriented         Anesthesia Plan  Type of Anesthesia, risks & benefits discussed:  Anesthesia Type:  general  Patient's Preference:   Intra-op Monitoring Plan:   Intra-op Monitoring Plan Comments:   Post Op Pain Control Plan:   Post Op Pain Control Plan Comments:   Induction:   IV  Beta Blocker:  Patient is not currently on a Beta-Blocker (No further documentation required).       Informed Consent: Patient understands risks and agrees with Anesthesia plan.  Questions answered. Anesthesia consent signed with patient.  ASA Score: 3     Day of Surgery Review of History & Physical:        Anesthesia Plan Notes: Propofol general.        Ready For Surgery From  Anesthesia Perspective.

## 2018-02-09 NOTE — TRANSFER OF CARE
Anesthesia Transfer of Care Note    Patient: Estela Pardo    Procedure(s) Performed: Procedure(s) (LRB):  ESOPHAGOGASTRODUODENOSCOPY (EGD) (N/A)    Patient location: PACU    Anesthesia Type: general    Transport from OR: Transported from OR on room air with adequate spontaneous ventilation    Post pain: adequate analgesia    Post assessment: no apparent anesthetic complications    Post vital signs: stable    Level of consciousness: awake, alert and oriented    Nausea/Vomiting: no nausea/vomiting    Complications: none    Transfer of care protocol was followed      Last vitals:   Visit Vitals  /62 (BP Location: Right arm, Patient Position: Lying)   Pulse 74   Temp (!) 7.9 °C (46.2 °F)   Resp 18   SpO2 96%   Breastfeeding? No

## 2018-02-09 NOTE — ANESTHESIA POSTPROCEDURE EVALUATION
Anesthesia Post Evaluation    Patient: Estela Pardo    Procedure(s) Performed: Procedure(s) (LRB):  ESOPHAGOGASTRODUODENOSCOPY (EGD) (N/A)    Final Anesthesia Type: general  Patient location during evaluation: PACU  Patient participation: Yes- Able to Participate  Level of consciousness: awake and alert  Post-procedure vital signs: reviewed and stable  Pain management: adequate  Airway patency: patent  PONV status at discharge: No PONV  Anesthetic complications: no      Cardiovascular status: hemodynamically stable  Respiratory status: unassisted and room air  Hydration status: euvolemic  Follow-up not needed.        Visit Vitals  /68 (BP Location: Left arm, Patient Position: Lying)   Pulse 66   Temp 36.7 °C (98 °F) (Skin)   Resp 16   SpO2 98%   Breastfeeding? No       Pain/Kathy Score: Pain Assessment Performed: Yes (2/9/2018 12:41 PM)  Presence of Pain: denies (2/9/2018 12:41 PM)  Kathy Score: 10 (2/9/2018 12:41 PM)

## 2018-02-09 NOTE — H&P
History & Physical - Short Stay  Gastroenterology      SUBJECTIVE:     Procedure: EGD    Chief Complaint/Indication for Procedure: Dysphagia and Epigastric Pain    PTA Medications   Medication Sig    bifidobacterium infantis (ALIGN) 4 mg Cap Take by mouth.    biotin 300 mcg Tab Take 1 tablet by mouth once daily.      calcium citrate-vitamin D3 315-200 mg (CITRACAL+D) 315-200 mg-unit per tablet Take 1 tablet by mouth 2 (two) times daily.      clonidine (CATAPRES) 0.1 MG tablet Take 0.1 mg by mouth 2 (two) times daily.    diltiazem (CARDIZEM LA) 360 mg 24 hr tablet Take 360 mg by mouth once daily. Every day    duloxetine (CYMBALTA) 60 MG capsule Take 60 mg by mouth once daily.      fish oil-omega-3 fatty acids 300-1,000 mg capsule Take 2 g by mouth once daily.      gabapentin (NEURONTIN) 300 MG capsule Take 600 mg by mouth 2 (two) times daily. B.I.D. and two at bedtime    garlic 1 mg Cap Take 1 tablet by mouth once daily.     hydrALAZINE (APRESOLINE) 25 MG tablet Take 25 mg by mouth 3 (three) times daily.    levothyroxine (SYNTHROID) 50 MCG tablet Take 50 mcg by mouth once daily.      metformin (GLUCOPHAGE) 500 MG tablet Take 500 mg by mouth daily with breakfast.     predniSONE (DELTASONE) 5 MG tablet 5 mg. Every day    valsartan-hydrochlorothiazide (DIOVAN-HCT) 160-25 mg per tablet Take 1 tablet by mouth once daily.      vitamin D 1000 units Tab Take 185 mg by mouth once daily.    abatacept (ORENCIA) 125 mg/mL Syrg Inject 125 mg into the skin every 7 days.    aspirin 81 mg Tab Take 1 tablet by mouth once daily. Every day    dexlansoprazole (DEXILANT) 60 mg capsule Take 1 capsule by mouth Daily.    oxycodone-acetaminophen (PERCOCET)  mg per tablet Take 1 tablet by mouth daily as needed for Pain.    sucralfate (CARAFATE) 100 mg/mL suspension Take 1 g by mouth 4 (four) times daily with meals and nightly.       Review of patient's allergies indicates:   Allergen Reactions    Methotrexate Other  (See Comments)     Other reaction(s): abscess under arms/ GI discomfort    Nsaids (non-steroidal anti-inflammatory drug) Other (See Comments)     GI upset        Past Medical History:   Diagnosis Date    Anemia     history of sickle cell trait    Anticoagulant long-term use     Arthritis     rheumatoid    Chronic constipation     Colon polyp     Diabetes mellitus     Esophageal stricture     Former smoker     Hypertension     RA (rheumatoid arthritis)     Sjoegren syndrome     Thyroid disease      Past Surgical History:   Procedure Laterality Date    COLONOSCOPY  4/9/2012    Dr. Lindsey at [facility]; internal hemorrhoids, colon polyp removed, repeat in 5 years for surveillance or prn    COLONOSCOPY N/A 2/17/2016    Procedure: COLONOSCOPY;  Surgeon: Bartolo Lindsey MD;  Location: [facility];  Service: Endoscopy;  Laterality: N/A;    EYE SURGERY      cataract     JOINT REPLACEMENT      right knee replacement    JOINT REPLACEMENT      Left knee replacement     TOE SURGERY      pin little toe right foot    TUBAL LIGATION      UPPER GASTROINTESTINAL ENDOSCOPY  2015     Family History   Problem Relation Age of Onset    Liver cancer Sister      Social History   Substance Use Topics    Smoking status: Former Smoker     Packs/day: 0.20     Years: 10.00     Types: Cigarettes     Quit date: 5/20/1985    Smokeless tobacco: Never Used    Alcohol use No         OBJECTIVE:     Vital Signs (Most Recent)       Physical Exam:                                                       GENERAL:  Comfortable, in no acute distress.                                 HEENT EXAM:  Nonicteric.  No adenopathy.  Oropharynx is clear.               NECK:  Supple.                                                               LUNGS:  Clear.                                                               CARDIAC:  Regular rate and rhythm.  S1, S2.  No murmur.                      ABDOMEN:  Soft, positive bowel  sounds, nontender.  No hepatosplenomegaly or masses.  No rebound or guarding.                                             EXTREMITIES:  No edema.     MENTAL STATUS:  Normal, alert and oriented.      ASSESSMENT/PLAN:     Assessment: Dysphagia and Epigastric Pain    Plan: EGD    Anesthesia Plan: General    ASA Grade: ASA 2 - Patient with mild systemic disease with no functional limitations    MALLAMPATI SCORE:  I (soft palate, uvula, fauces, and tonsillar pillars visible)

## 2018-02-09 NOTE — DISCHARGE INSTRUCTIONS
Recovery After Procedural Sedation (Adult)  You have been given medicine by vein to make you sleep during your surgery. This may have included both a pain medicine and sleeping medicine. Most of the effects have worn off. But you may still have some drowsiness for the next 6 to 8 hours.  Home care  Follow these guidelines when you get home:  · For the next 8 hours, you should be watched by a responsible adult. This person should make sure your condition is not getting worse.  · Don't drink any alcohol for the next 24 hours.  · Don't drive, operate dangerous machinery, or make important business or personal decisions during the next 24 hours.  Note: Your healthcare provider may tell you not to take any medicine by mouth for pain or sleep in the next 4 hours. These medicines may react with the medicines you were given in the hospital. This could cause a much stronger response than usual.  Follow-up care  Follow up with your healthcare provider if you are not alert and back to your usual level of activity within 12 hours.  When to seek medical advice  Call your healthcare provider right away if any of these occur:  · Drowsiness gets worse  · Weakness or dizziness gets worse  · Repeated vomiting  · You can't be awakened   Date Last Reviewed: 10/18/2016  © 6158-8354 [employer]. [address] [place] [address], [place] [address]. All rights reserved. This information is not intended as a substitute for professional medical care. Always follow your healthcare professional's instructions.        Esophageal Dilation     A balloon dilator may be used to widen a stricture in the esophagus.   An esophageal dilation is a procedure used to widen a narrowed section of your esophagus. This is the tube that leads from your throat to your stomach. Narrowing (stricture) of the esophagus can cause problems. These include trouble swallowing. This sheet explains what to expect with esophageal dilation.  Why esophageal dilation is  needed  Several problems can be treated with esophageal dilation. They include:  · Peptic stricture. This is caused by reflux esophagitis. With this problem, the esophagus is irritated by acid reflux (heartburn). This occurs when acid from your stomach flows back up into the esophagus. Stomach acid damages the lining of the esophagus. This leads to a buildup of scar tissue. As a result, the esophagus is narrowed.  · Schatzkis ring. This is an abnormal ring of tissue. It forms where the esophagus meets the stomach. It can cause trouble swallowing. It can also cause food to get stuck in the esophagus. The cause of this condition is not known.  · Achalasia. This condition stops food and liquids from moving into your stomach from the esophagus. It affects the lower esophageal sphincter (LES). The LES is a muscular ring that opens (relaxes) when you swallow. With achalasia, the LES does not relax. This condition can also cause problems with peristalsis. This is the normal muscular action of the esophagus that moves food into the stomach.  · Eosinophilic esophagitis. This is a redness and swelling (inflammation) in the esophagus. It is caused by an environmental trigger such as a food allergy. It can lead to pain, trouble swallowing, and strictures.  · Less common causes of stricture. Other causes of stricture include radiation treatment and cancer.  Before you have esophageal dilation  · Tell your provider about any medicines you take. This includes prescription medicines, over-the-counter medicines, herbs, vitamins, and other supplements. Be sure to mention aspirin or any blood thinners youre taking.  · Let your provider know if you need to take antibiotics before dental procedures. You may need to take them before esophageal dilation as well.  · Tell your provider about any health conditions you have, such as heart or lung disease. Also mention any allergies to medicines.  · Youll need to have an empty stomach for  the procedure. Follow your providers instructions for not eating and drinking before the procedure.  · Arrange to have a family member or friend drive you home after the procedure.  During the procedure  · You may be given local anesthesia to numb your throat. Youll also likely be given medicine to relax you. The procedure takes about 15 minutes. It does not cause trouble breathing.  · A tube called an endoscope (scope) is used. This is a narrow tube with a tiny light and camera at the end. The scope is inserted through your mouth and into your esophagus. It lets your provider see inside your esophagus. To help guide your provider, an imaging method called fluoroscopy may also be used. This creates a moving X-ray image on a computer screen.   · Next, special tiny tools are carefully guided through your mouth and down into the esophagus. They widen the stricture and are then removed. Different types of instruments are used. The type used depends on the size and cause of the stricture. Types include:  ¨ Balloon dilator. A tiny empty balloon is put into the stricture using an endoscope. The balloon is slowly filled with air. The air is removed from the balloon when the stricture is widened to the right size. Balloon dilators are used to treat many types of strictures.  ¨ Guided wire dilator. A thin wire is eased down the esophagus. A small tube thats wider on one end is guided down the wire. It is put into the stricture to stretch it. These dilators are used to treat all kinds of strictures.  ¨ Bougies. These are weighted, cone-shaped tubes. Starting with smaller cones, your provider uses increasingly larger cones until the stricture is stretched the right amount. Bougies are often used to treat strictures that are simple (short, straight, and not very narrow).  After the procedure  · Youll be watched closely until your provider says youre ready to go home. Youll need to have a friend or family member drive you  home.  · You may have a sore throat for the rest of the day.  · You may have pain behind your breastbone for a short time afterwards.  · You can start drinking fluids again after the numbness in your throat goes away. You can resume eating the same day or the next day.  Risks and possible complications  Risks and possible complications for esophageal dilation include:  · Infection  · A tear or hole in the esophagus lining, causing bleeding and possibly needing surgery to fix  · Risks of anesthesia  Follow-up  You may need to have the procedure repeated one or more times. This depends on the cause and extent of the narrowing. Repeat procedures can allow the dilation to take place more slowly. This reduces the risks of the procedure.  If your stricture was caused by reflux esophagitis, youll likely need to take medicine to treat that condition. Your provider will tell you more.  When to call your provider  Call your healthcare provider right away if you have any of the following after the procedure:  · Fever of 100.4°F (38.0°C)  · Chest pain  · Trouble swallowing  · Vomiting blood or material that looks like coffee grounds  · Bleeding  · Black, tarry, or bloody stools   Date Last Reviewed: 7/1/2016  © 9738-7837 [employer]. [address] [place] [address], [place] [address]. All rights reserved. This information is not intended as a substitute for professional medical care. Always follow your healthcare professional's instructions.

## 2018-02-09 NOTE — DISCHARGE SUMMARY
Discharge Note  Short Stay      SUMMARY     Admit Date: 2/9/2018    Attending Physician: Bartolo Lindsey MD     Discharge Physician: Bartolo Lindsey MD    Discharge Date: 2/9/2018 12:28 PM    Final Diagnosis: Epigastric pain [R10.13]    Disposition: HOME OR SELF CARE    Patient Instructions:   Current Discharge Medication List      CONTINUE these medications which have NOT CHANGED    Details   bifidobacterium infantis (ALIGN) 4 mg Cap Take by mouth.      biotin 300 mcg Tab Take 1 tablet by mouth once daily.        calcium citrate-vitamin D3 315-200 mg (CITRACAL+D) 315-200 mg-unit per tablet Take 1 tablet by mouth 2 (two) times daily.        clonidine (CATAPRES) 0.1 MG tablet Take 0.1 mg by mouth 2 (two) times daily.      diltiazem (CARDIZEM LA) 360 mg 24 hr tablet Take 360 mg by mouth once daily. Every day      duloxetine (CYMBALTA) 60 MG capsule Take 60 mg by mouth once daily.        fish oil-omega-3 fatty acids 300-1,000 mg capsule Take 2 g by mouth once daily.        gabapentin (NEURONTIN) 300 MG capsule Take 600 mg by mouth 2 (two) times daily. B.I.D. and two at bedtime      garlic 1 mg Cap Take 1 tablet by mouth once daily.       hydrALAZINE (APRESOLINE) 25 MG tablet Take 25 mg by mouth 3 (three) times daily.      levothyroxine (SYNTHROID) 50 MCG tablet Take 50 mcg by mouth once daily.        metformin (GLUCOPHAGE) 500 MG tablet Take 500 mg by mouth daily with breakfast.       predniSONE (DELTASONE) 5 MG tablet 5 mg. Every day      valsartan-hydrochlorothiazide (DIOVAN-HCT) 160-25 mg per tablet Take 1 tablet by mouth once daily.        vitamin D 1000 units Tab Take 185 mg by mouth once daily.      abatacept (ORENCIA) 125 mg/mL Syrg Inject 125 mg into the skin every 7 days.      aspirin 81 mg Tab Take 1 tablet by mouth once daily. Every day      dexlansoprazole (DEXILANT) 60 mg capsule Take 1 capsule by mouth Daily.      oxycodone-acetaminophen (PERCOCET)  mg per tablet Take 1 tablet by mouth daily  as needed for Pain.      sucralfate (CARAFATE) 100 mg/mL suspension Take 1 g by mouth 4 (four) times daily with meals and nightly.             Discharge Procedure Orders (must include Diet, Follow-up, Activity)    Follow Up:  Follow up with PCP as previously scheduled  Resume routine diet.  Activity as tolerated.    No driving day of procedure.

## 2018-10-24 ENCOUNTER — TELEPHONE (OUTPATIENT)
Dept: GASTROENTEROLOGY | Facility: CLINIC | Age: 68
End: 2018-10-24

## 2018-10-24 NOTE — TELEPHONE ENCOUNTER
S/w pt she stated she needs a colon for weight loss per her PCP. I explained to pt that I would need an order to scan in the system with her last clinic note from her PCP to scan in system for referral department to get colon approved. Fax number given to pt. She verbalized understanding.

## 2018-10-24 NOTE — TELEPHONE ENCOUNTER
----- Message from Debra Del Castillo RT sent at 10/23/2018  1:58 PM CDT -----  Contact: pt    pt  / [phone], requesting a call back to schedule her colonoscopy appt, thanks.

## 2019-03-08 ENCOUNTER — TELEPHONE (OUTPATIENT)
Dept: GASTROENTEROLOGY | Facility: CLINIC | Age: 69
End: 2019-03-08

## 2019-03-08 RX ORDER — VALSARTAN 320 MG/1
320 TABLET ORAL DAILY
COMMUNITY

## 2019-03-08 NOTE — TELEPHONE ENCOUNTER
Pt having difficulty swallowing. She has been scheduled for an EGD on 3/26. Confirmation letter and instructions mailed to pt.

## 2019-03-08 NOTE — TELEPHONE ENCOUNTER
----- Message from Karen Vallejo sent at 3/8/2019  9:01 AM CST -----  Type:  Sooner Apoointment Request    Caller is requesting a sooner appointment.  Caller declined first available appointment listed below.  Caller will not accept being placed on the waitlist and is requesting a message be sent to doctor.    Name of Caller:  Self   When is the first available appointment?  NA   Symptoms:  NA   Best Call Back Number:  [phone]   Additional Information:  Patient requesting to schedule EGD

## 2019-03-11 ENCOUNTER — ANESTHESIA (OUTPATIENT)
Dept: ENDOSCOPY | Facility: HOSPITAL | Age: 69
End: 2019-03-11
Payer: MEDICARE

## 2019-03-11 ENCOUNTER — ANESTHESIA EVENT (OUTPATIENT)
Dept: ENDOSCOPY | Facility: HOSPITAL | Age: 69
End: 2019-03-11
Payer: MEDICARE

## 2019-03-11 ENCOUNTER — HOSPITAL ENCOUNTER (OUTPATIENT)
Facility: HOSPITAL | Age: 69
Discharge: HOME | End: 2019-03-11
Attending: INTERNAL MEDICINE | Admitting: INTERNAL MEDICINE
Payer: MEDICARE

## 2019-03-11 VITALS
OXYGEN SATURATION: 95 % | HEIGHT: 66 IN | HEART RATE: 55 BPM | RESPIRATION RATE: 14 BRPM | SYSTOLIC BLOOD PRESSURE: 117 MMHG | WEIGHT: 172 LBS | DIASTOLIC BLOOD PRESSURE: 59 MMHG | TEMPERATURE: 97 F | BODY MASS INDEX: 27.64 KG/M2

## 2019-03-11 DIAGNOSIS — R13.10 DYSPHAGIA: ICD-10-CM

## 2019-03-11 PROCEDURE — 25000003 PHARM REV CODE 250: Mod: PO | Performed by: INTERNAL MEDICINE

## 2019-03-11 PROCEDURE — D9220A PRA ANESTHESIA: ICD-10-PCS | Mod: CRNA,,, | Performed by: NURSE ANESTHETIST, CERTIFIED REGISTERED

## 2019-03-11 PROCEDURE — 43248 EGD GUIDE WIRE INSERTION: CPT | Mod: ,,, | Performed by: INTERNAL MEDICINE

## 2019-03-11 PROCEDURE — 43248 PR EGD, FLEX, W/DILATION OVER GUIDEWIRE: ICD-10-PCS | Mod: ,,, | Performed by: INTERNAL MEDICINE

## 2019-03-11 PROCEDURE — 43248 EGD GUIDE WIRE INSERTION: CPT | Mod: PO | Performed by: INTERNAL MEDICINE

## 2019-03-11 PROCEDURE — 37000009 HC ANESTHESIA EA ADD 15 MINS: Mod: PO | Performed by: INTERNAL MEDICINE

## 2019-03-11 PROCEDURE — D9220A PRA ANESTHESIA: ICD-10-PCS | Mod: ANES,,, | Performed by: ANESTHESIOLOGY

## 2019-03-11 PROCEDURE — 27201012 HC FORCEPS, HOT/COLD, DISP: Mod: PO | Performed by: INTERNAL MEDICINE

## 2019-03-11 PROCEDURE — 37000008 HC ANESTHESIA 1ST 15 MINUTES: Mod: PO | Performed by: INTERNAL MEDICINE

## 2019-03-11 PROCEDURE — 88305 TISSUE EXAM BY PATHOLOGIST: CPT | Performed by: PATHOLOGY

## 2019-03-11 PROCEDURE — 88305 TISSUE SPECIMEN TO PATHOLOGY - SURGERY: ICD-10-PCS | Mod: 26,,, | Performed by: PATHOLOGY

## 2019-03-11 PROCEDURE — D9220A PRA ANESTHESIA: Mod: CRNA,,, | Performed by: NURSE ANESTHETIST, CERTIFIED REGISTERED

## 2019-03-11 PROCEDURE — 63600175 PHARM REV CODE 636 W HCPCS: Mod: PO | Performed by: NURSE ANESTHETIST, CERTIFIED REGISTERED

## 2019-03-11 PROCEDURE — 43239 EGD BIOPSY SINGLE/MULTIPLE: CPT | Mod: PO | Performed by: INTERNAL MEDICINE

## 2019-03-11 PROCEDURE — 88305 TISSUE EXAM BY PATHOLOGIST: CPT | Mod: 26,,, | Performed by: PATHOLOGY

## 2019-03-11 PROCEDURE — D9220A PRA ANESTHESIA: Mod: ANES,,, | Performed by: ANESTHESIOLOGY

## 2019-03-11 RX ORDER — LIDOCAINE HCL/PF 100 MG/5ML
SYRINGE (ML) INTRAVENOUS
Status: DISCONTINUED | OUTPATIENT
Start: 2019-03-11 | End: 2019-03-11

## 2019-03-11 RX ORDER — SODIUM CHLORIDE 0.9 % (FLUSH) 0.9 %
3 SYRINGE (ML) INJECTION
Status: DISCONTINUED | OUTPATIENT
Start: 2019-03-11 | End: 2019-03-11 | Stop reason: HOSPADM

## 2019-03-11 RX ORDER — SODIUM CHLORIDE, SODIUM LACTATE, POTASSIUM CHLORIDE, CALCIUM CHLORIDE 600; 310; 30; 20 MG/100ML; MG/100ML; MG/100ML; MG/100ML
INJECTION, SOLUTION INTRAVENOUS CONTINUOUS
Status: DISCONTINUED | OUTPATIENT
Start: 2019-03-11 | End: 2019-03-11 | Stop reason: HOSPADM

## 2019-03-11 RX ORDER — PROPOFOL 10 MG/ML
VIAL (ML) INTRAVENOUS
Status: DISCONTINUED | OUTPATIENT
Start: 2019-03-11 | End: 2019-03-11

## 2019-03-11 RX ADMIN — PROPOFOL 100 MG: 10 INJECTION, EMULSION INTRAVENOUS at 07:03

## 2019-03-11 RX ADMIN — PROPOFOL 25 MG: 10 INJECTION, EMULSION INTRAVENOUS at 07:03

## 2019-03-11 RX ADMIN — LIDOCAINE HYDROCHLORIDE 75 MG: 20 INJECTION, SOLUTION INTRAVENOUS at 07:03

## 2019-03-11 RX ADMIN — SODIUM CHLORIDE, SODIUM LACTATE, POTASSIUM CHLORIDE, AND CALCIUM CHLORIDE: .6; .31; .03; .02 INJECTION, SOLUTION INTRAVENOUS at 07:03

## 2019-03-11 NOTE — PLAN OF CARE
Vss, chloe po fluids, denies pain, ambulates easily. IV removed, catheter intact. Discharge instructions provided and states understanding. States ready to go home.  Discharged from facility with family per wheelchair.

## 2019-03-11 NOTE — DISCHARGE SUMMARY
Discharge Note  Short Stay      SUMMARY     Admit Date: 3/11/2019    Attending Physician: Bartolo Lindsey MD     Discharge Physician: Bartolo Lindsey MD    Discharge Date: 3/11/2019 7:48 AM    Final Diagnosis: Dysphagia, unspecified type [R13.10]    Disposition: HOME OR SELF CARE    Patient Instructions:   Current Discharge Medication List      CONTINUE these medications which have NOT CHANGED    Details   abatacept (ORENCIA) 125 mg/mL Syrg Inject 125 mg into the skin every 7 days.      aspirin 81 mg Tab Take 1 tablet by mouth once daily. Every day      bifidobacterium infantis (ALIGN) 4 mg Cap Take by mouth.      biotin 300 mcg Tab Take 1 tablet by mouth once daily.        calcium citrate-vitamin D3 315-200 mg (CITRACAL+D) 315-200 mg-unit per tablet Take 1 tablet by mouth 2 (two) times daily.        clonidine (CATAPRES) 0.1 MG tablet Take 0.1 mg by mouth 2 (two) times daily.      dexlansoprazole (DEXILANT) 60 mg capsule Take 1 capsule by mouth Daily.      diltiazem (CARDIZEM LA) 360 mg 24 hr tablet Take 360 mg by mouth once daily. Every day      duloxetine (CYMBALTA) 60 MG capsule Take 60 mg by mouth once daily.        fish oil-omega-3 fatty acids 300-1,000 mg capsule Take 2 g by mouth once daily.        gabapentin (NEURONTIN) 300 MG capsule Take 600 mg by mouth 2 (two) times daily. B.I.D. and two at bedtime      garlic 1 mg Cap Take 1 tablet by mouth once daily.       hydrALAZINE (APRESOLINE) 25 MG tablet Take 25 mg by mouth 3 (three) times daily.      levothyroxine (SYNTHROID) 50 MCG tablet Take 50 mcg by mouth once daily.        metformin (GLUCOPHAGE) 500 MG tablet Take 500 mg by mouth daily with breakfast.       oxycodone-acetaminophen (PERCOCET)  mg per tablet Take 1 tablet by mouth daily as needed for Pain.      predniSONE (DELTASONE) 5 MG tablet 5 mg. Every day      valsartan (DIOVAN) 320 MG tablet Take 320 mg by mouth once daily.      vitamin D 1000 units Tab Take 185 mg by mouth once daily.       sucralfate (CARAFATE) 100 mg/mL suspension Take 1 g by mouth 4 (four) times daily with meals and nightly.             Discharge Procedure Orders (must include Diet, Follow-up, Activity)    Follow Up:  Follow up with PCP as previously scheduled  Resume routine diet.  Activity as tolerated.    No driving day of procedure.

## 2019-03-11 NOTE — ANESTHESIA POSTPROCEDURE EVALUATION
"Anesthesia Post Evaluation    Patient: Estela Pardo    Procedure(s) Performed: Procedure(s) (LRB):  EGD (ESOPHAGOGASTRODUODENOSCOPY) (N/A)    Final Anesthesia Type: general  Patient location during evaluation: PACU  Patient participation: Yes- Able to Participate  Level of consciousness: awake and alert, oriented and awake  Post-procedure vital signs: reviewed and stable  Pain management: adequate  Airway patency: patent  PONV status at discharge: No PONV  Anesthetic complications: no      Cardiovascular status: blood pressure returned to baseline and hemodynamically stable  Respiratory status: unassisted, spontaneous ventilation and room air  Hydration status: euvolemic  Follow-up not needed.        Visit Vitals  BP (!) 117/59 (BP Location: Left arm, Patient Position: Lying)   Pulse (!) 55   Temp 36.2 °C (97.2 °F) (Skin)   Resp 14   Ht 5' 6" (1.676 m)   Wt 78 kg (172 lb)   SpO2 95%   Breastfeeding? No   BMI 27.76 kg/m²       Pain/Kathy Score: Kathy Score: 10 (3/11/2019  8:19 AM)        "

## 2019-03-11 NOTE — DISCHARGE INSTRUCTIONS
Recovery After Procedural Sedation (Adult)  You have been given medicine by vein to make you sleep during your surgery. This may have included both a pain medicine and sleeping medicine. Most of the effects have worn off. But you may still have some drowsiness for the next 6 to 8 hours.  Home care  Follow these guidelines when you get home:  · For the next 8 hours, you should be watched by a responsible adult. This person should make sure your condition is not getting worse.  · Don't drink any alcohol for the next 24 hours.  · Don't drive, operate dangerous machinery, or make important business or personal decisions during the next 24 hours.  Note: Your healthcare provider may tell you not to take any medicine by mouth for pain or sleep in the next 4 hours. These medicines may react with the medicines you were given in the hospital. This could cause a much stronger response than usual.  Follow-up care  Follow up with your healthcare provider if you are not alert and back to your usual level of activity within 12 hours.  When to seek medical advice  Call your healthcare provider right away if any of these occur:  · Drowsiness gets worse  · Weakness or dizziness gets worse  · Repeated vomiting  · You can't be awakened   Date Last Reviewed: 10/18/2016  © 2300-9145 [employer]. [address] [place] [address], [place] [address]. All rights reserved. This information is not intended as a substitute for professional medical care. Always follow your healthcare professional's instructions.

## 2019-03-11 NOTE — H&P
History & Physical - Short Stay  Gastroenterology      SUBJECTIVE:     Procedure: EGD    Chief Complaint/Indication for Procedure: Dysphagia    PTA Medications   Medication Sig    abatacept (ORENCIA) 125 mg/mL Syrg Inject 125 mg into the skin every 7 days.    aspirin 81 mg Tab Take 1 tablet by mouth once daily. Every day    bifidobacterium infantis (ALIGN) 4 mg Cap Take by mouth.    biotin 300 mcg Tab Take 1 tablet by mouth once daily.      calcium citrate-vitamin D3 315-200 mg (CITRACAL+D) 315-200 mg-unit per tablet Take 1 tablet by mouth 2 (two) times daily.      clonidine (CATAPRES) 0.1 MG tablet Take 0.1 mg by mouth 2 (two) times daily.    dexlansoprazole (DEXILANT) 60 mg capsule Take 1 capsule by mouth Daily.    diltiazem (CARDIZEM LA) 360 mg 24 hr tablet Take 360 mg by mouth once daily. Every day    duloxetine (CYMBALTA) 60 MG capsule Take 60 mg by mouth once daily.      fish oil-omega-3 fatty acids 300-1,000 mg capsule Take 2 g by mouth once daily.      gabapentin (NEURONTIN) 300 MG capsule Take 600 mg by mouth 2 (two) times daily. B.I.D. and two at bedtime    garlic 1 mg Cap Take 1 tablet by mouth once daily.     hydrALAZINE (APRESOLINE) 25 MG tablet Take 25 mg by mouth 3 (three) times daily.    levothyroxine (SYNTHROID) 50 MCG tablet Take 50 mcg by mouth once daily.      metformin (GLUCOPHAGE) 500 MG tablet Take 500 mg by mouth daily with breakfast.     oxycodone-acetaminophen (PERCOCET)  mg per tablet Take 1 tablet by mouth daily as needed for Pain.    predniSONE (DELTASONE) 5 MG tablet 5 mg. Every day    valsartan (DIOVAN) 320 MG tablet Take 320 mg by mouth once daily.    vitamin D 1000 units Tab Take 185 mg by mouth once daily.    sucralfate (CARAFATE) 100 mg/mL suspension Take 1 g by mouth 4 (four) times daily with meals and nightly.       Review of patient's allergies indicates:   Allergen Reactions    Methotrexate Other (See Comments)     Other reaction(s): abscess under  arms/ GI discomfort    Nsaids (non-steroidal anti-inflammatory drug) Other (See Comments)     GI upset        Past Medical History:   Diagnosis Date    Anemia     history of sickle cell trait    Anticoagulant long-term use     Arthritis     rheumatoid    Chronic constipation     Colon polyp     Diabetes mellitus     medication induced    Esophageal stricture     Former smoker     Hypertension     RA (rheumatoid arthritis)     Sjoegren syndrome     Thyroid disease     hypothyroidism     Past Surgical History:   Procedure Laterality Date    COLONOSCOPY  2012    Dr. Lindsey at [facility] section; internal hemorrhoids, colon polyp removed, repeat in 5 years for surveillance or prn    COLONOSCOPY N/A 2016    Performed by [facility]olo [facility] at [facility]    EGD (ESOPHAGOGASTRODUODENOSCOPY) N/A 2014    Performed by [facility]olo Lindsey MD at [facility]    EGD (ESOPHAGOGASTRODUODENOSCOPY) N/A 10/22/2012    Performed by [facility] Lindsey MD at [facility]    ESOPHAGOGASTRODUODENOSCOPY (EGD) N/A 2018    Performed by [facility] Lindsey MD at [facility]    ESOPHAGOGASTRODUODENOSCOPY (EGD) N/A 2017    Performed by [facility] MD at [facility]    ESOPHAGOGASTRODUODENOSCOPY (EGD) N/A 2016    Performed by [facility] at [facility]    ESOPHAGOGASTRODUODENOSCOPY (EGD) N/A 2015    Performed by [facility] MD at [facility]    EYE SURGERY      cataract     JOINT REPLACEMENT      right knee replacement    JOINT REPLACEMENT      Left knee replacement     TOE SURGERY      pin little toe right foot    TUBAL LIGATION      UPPER GASTROINTESTINAL ENDOSCOPY       Family History   Problem Relation Age of Onset    Liver cancer Sister      Social History     Tobacco Use    Smoking status: Former Smoker     Packs/day: 0.20     Years: 10.00     Pack years: 2.00     Types: Cigarettes     Last attempt to quit: 1985     Years since quittin.8     Smokeless tobacco: Never Used   Substance Use Topics    Alcohol use: No    Drug use: No         OBJECTIVE:     Vital Signs (Most Recent)  Temp: 97.8 °F (36.6 °C) (03/11/19 0700)  Pulse: (!) 58 (03/11/19 0700)  Resp: 10 (03/11/19 0700)  BP: 138/68 (03/11/19 0700)  SpO2: 97 % (03/11/19 0700)    Physical Exam:                                                       GENERAL:  Comfortable, in no acute distress.                                 HEENT EXAM:  Nonicteric.  No adenopathy.  Oropharynx is clear.               NECK:  Supple.                                                               LUNGS:  Clear.                                                               CARDIAC:  Regular rate and rhythm.  S1, S2.  No murmur.                      ABDOMEN:  Soft, positive bowel sounds, nontender.  No hepatosplenomegaly or masses.  No rebound or guarding.                                             EXTREMITIES:  No edema.     MENTAL STATUS:  Normal, alert and oriented.      ASSESSMENT/PLAN:     Assessment: Dysphagia    Plan: EGD    Anesthesia Plan: General    ASA Grade: ASA 2 - Patient with mild systemic disease with no functional limitations    MALLAMPATI SCORE:  I (soft palate, uvula, fauces, and tonsillar pillars visible)

## 2019-03-11 NOTE — ANESTHESIA PREPROCEDURE EVALUATION
03/11/2019  Estela Pardo is a 68 y.o., female.    Pre-op Assessment      I have reviewed the Medications.   Steroids Taken In Past Year: Prednisone    Review of Systems  Anesthesia Hx:  No problems with previous Anesthesia    Social:  Non-Smoker, No Alcohol Use    Hematology/Oncology:        Hematology Comments: Sickle cell trait   EENT/Dental:   Sjogren syndrome   Cardiovascular:   Hypertension    Pulmonary:  Pulmonary Normal    Renal/:  Renal/ Normal     Hepatic/GI:  Hepatic/GI Normal    Musculoskeletal:   Arthritis (rheumatoid arthritis)     Neurological:   Chronic Pain Syndrome   Endocrine:   Diabetes Hypothyroidism        Physical Exam  General:  Well nourished    Airway/Jaw/Neck:  Airway Findings: Mouth Opening: Normal General Airway Assessment: Adult  Mallampati: II  Jaw/Neck Findings:  Neck ROM: Extension Decreased, Mild       Chest/Lungs:  Chest/Lungs Findings: Clear to auscultation, Normal Respiratory Rate     Heart/Vascular:  Heart Findings: Rate: Normal  Rhythm: Regular Rhythm  Sounds: Normal  Heart murmur: negative Vascular Findings: Normal (No carotid bruits.)       Mental Status:  Mental Status Findings:  Cooperative, Alert and Oriented         Anesthesia Plan  Type of Anesthesia, risks & benefits discussed:  Anesthesia Type:  general  Patient's Preference:   Intra-op Monitoring Plan:   Intra-op Monitoring Plan Comments:   Post Op Pain Control Plan:   Post Op Pain Control Plan Comments:   Induction:   IV  Beta Blocker:  Patient is not currently on a Beta-Blocker (No further documentation required).       Informed Consent: Patient understands risks and agrees with Anesthesia plan.  Questions answered. Anesthesia consent signed with patient.  ASA Score: 3     Day of Surgery Review of History & Physical:        Anesthesia Plan Notes: Propofol general.        Ready For Surgery From Anesthesia  Perspective.

## 2019-03-11 NOTE — [employer] PATIENT INSTRUCTIONS
Discharge Summary/Instructions after an Endoscopic Procedure  Patient Name: Estela Pardo  Patient MRN: [id number]  Patient YOB: 1950  Monday, March 11, 2019  Bartolo Lindsey MD  RESTRICTIONS:  During your procedure today, you received medications for sedation.  These   medications may affect your judgment, balance and coordination.  Therefore,   for 24 hours, you have the following restrictions:   - DO NOT drive a car, operate machinery, make legal/financial decisions,   sign important papers or drink alcohol.    ACTIVITY:  Today: no heavy lifting, straining or running due to procedural   sedation/anesthesia.  The following day: return to full activity including work.  DIET:  Eat and drink normally unless instructed otherwise.     TREATMENT FOR COMMON SIDE EFFECTS:  - Mild abdominal pain, nausea, belching, bloating or excessive gas:  rest,   eat lightly and use a heating pad.  - Sore Throat: treat with throat lozenges and/or gargle with warm salt   water.  - Because air was used during the procedure, expelling large amounts of air   from your rectum or belching is normal.  - If a bowel prep was taken, you may not have a bowel movement for 1-3 days.    This is normal.  SYMPTOMS TO WATCH FOR AND REPORT TO YOUR PHYSICIAN:  1. Abdominal pain or bloating, other than gas cramps.  2. Chest pain.  3. Back pain.  4. Signs of infection such as: chills or fever occurring within 24 hours   after the procedure.  5. Rectal bleeding, which would show as bright red, maroon, or black stools.   (A tablespoon of blood from the rectum is not serious, especially if   hemorrhoids are present.)  6. Vomiting.  7. Weakness or dizziness.  GO DIRECTLY TO THE NEAREST EMERGENCY ROOM IF YOU HAVE ANY OF THE FOLLOWING:      Difficulty breathing              Chills and/or fever over 101 F   Persistent vomiting and/or vomiting blood   Severe abdominal pain   Severe chest pain   Black, tarry stools   Bleeding- more than one  tablespoon   Any other symptom or condition that you feel may need urgent attention  Your doctor recommends these additional instructions:  If any biopsies were taken, your doctors clinic will contact you in 1 to 2   weeks with any results.  We are waiting for your pathology results.   Continue your present medications.   You are being discharged to home.  For questions, problems or results please call your physician - Bartolo Lindsey MD at Work:  [phone].  EMERGENCY PHONE NUMBER: [phone], LAB RESULTS: [phone]  IF A COMPLICATION OR EMERGENCY SITUATION ARISES AND YOU ARE UNABLE TO REACH   YOUR PHYSICIAN - GO DIRECTLY TO THE EMERGENCY ROOM.  ___________________________________________  Nurse Signature  ___________________________________________  Patient/Designated Responsible Party Signature  Bartolo Lindsey MD  3/11/2019 7:47:06 AM  This report has been verified and signed electronically.  [employer]

## 2019-03-11 NOTE — TRANSFER OF CARE
"Anesthesia Transfer of Care Note    Patient: Estela Pardo    Procedure(s) Performed: Procedure(s) (LRB):  EGD (ESOPHAGOGASTRODUODENOSCOPY) (N/A)    Patient location: PACU    Anesthesia Type: general    Transport from OR: Transported from OR on room air with adequate spontaneous ventilation    Post pain: adequate analgesia    Post assessment: no apparent anesthetic complications and tolerated procedure well    Post vital signs: stable    Level of consciousness: awake    Nausea/Vomiting: no nausea/vomiting    Complications: none    Transfer of care protocol was followed      Last vitals:   Visit Vitals  /68 (BP Location: Right arm, Patient Position: Lying)   Pulse (!) 58   Temp 36.6 °C (97.8 °F) (Skin)   Resp 10   Ht 5' 6" (1.676 m)   Wt 78 kg (172 lb)   SpO2 97%   Breastfeeding? No   BMI 27.76 kg/m²     "

## 2019-07-10 ENCOUNTER — TELEPHONE (OUTPATIENT)
Dept: GASTROENTEROLOGY | Facility: CLINIC | Age: 69
End: 2019-07-10

## 2019-07-10 NOTE — TELEPHONE ENCOUNTER
Spoke with pt. Pt states she is feeling better but over the weekend had diarrhea. Pt asked for something to keep her from getting constipated. Pt informed above OTC stool softeners/laxatives but to not take until diarrhea has subsided. Pt instructed to call clinic back if she feels appointment is needed. Pt verbalized understanding to all.

## 2019-07-10 NOTE — TELEPHONE ENCOUNTER
----- Message from Karen Vallejo sent at 7/9/2019  2:07 PM CDT -----  Type: Needs Medical Advice    Who Called:  Self Symptoms (please be specific):  Diarrhea, acid refluxHow long has patient had these symptoms:   Pharmacy name and phone #:    Best Call Back Number: [phone]  Additional Information: Patient requesting to discuss with the nurse.     home

## 2020-06-25 ENCOUNTER — TELEPHONE (OUTPATIENT)
Dept: GASTROENTEROLOGY | Facility: CLINIC | Age: 70
End: 2020-06-25

## 2020-06-25 NOTE — TELEPHONE ENCOUNTER
Spoke with pt. Scheduled appointment from referral. Pt informed she needs to be seen in clinic prior to EGD. Pt verbalized understanding to all.

## 2020-06-30 ENCOUNTER — OFFICE VISIT (OUTPATIENT)
Dept: GASTROENTEROLOGY | Facility: CLINIC | Age: 70
End: 2020-06-30
Payer: MEDICARE

## 2020-06-30 VITALS
HEART RATE: 65 BPM | HEIGHT: 66 IN | BODY MASS INDEX: 28.21 KG/M2 | SYSTOLIC BLOOD PRESSURE: 108 MMHG | DIASTOLIC BLOOD PRESSURE: 66 MMHG | WEIGHT: 175.5 LBS

## 2020-06-30 DIAGNOSIS — R10.13 EPIGASTRIC PAIN: Primary | ICD-10-CM

## 2020-06-30 DIAGNOSIS — K59.03 DRUG-INDUCED CONSTIPATION: ICD-10-CM

## 2020-06-30 DIAGNOSIS — R19.4 CHANGE IN BOWEL HABITS: ICD-10-CM

## 2020-06-30 DIAGNOSIS — R13.19 ESOPHAGEAL DYSPHAGIA: ICD-10-CM

## 2020-06-30 DIAGNOSIS — Z86.010 HISTORY OF COLON POLYPS: ICD-10-CM

## 2020-06-30 DIAGNOSIS — F11.90 OPIOID USE: ICD-10-CM

## 2020-06-30 DIAGNOSIS — K21.9 GASTROESOPHAGEAL REFLUX DISEASE WITHOUT ESOPHAGITIS: ICD-10-CM

## 2020-06-30 DIAGNOSIS — K22.2 SCHATZKI'S RING: ICD-10-CM

## 2020-06-30 PROCEDURE — 99214 PR OFFICE/OUTPT VISIT, EST, LEVL IV, 30-39 MIN: ICD-10-PCS | Mod: S$PBB,,, | Performed by: NURSE PRACTITIONER

## 2020-06-30 PROCEDURE — 99999 PR PBB SHADOW E&M-EST. PATIENT-LVL V: ICD-10-PCS | Mod: PBBFAC,,, | Performed by: NURSE PRACTITIONER

## 2020-06-30 PROCEDURE — 99999 PR PBB SHADOW E&M-EST. PATIENT-LVL V: CPT | Mod: PBBFAC,,, | Performed by: NURSE PRACTITIONER

## 2020-06-30 PROCEDURE — 99214 OFFICE O/P EST MOD 30 MIN: CPT | Mod: S$PBB,,, | Performed by: NURSE PRACTITIONER

## 2020-06-30 PROCEDURE — 99215 OFFICE O/P EST HI 40 MIN: CPT | Mod: PBBFAC,PO | Performed by: NURSE PRACTITIONER

## 2020-06-30 RX ORDER — LORAZEPAM 0.5 MG/1
0.5 TABLET ORAL EVERY 6 HOURS PRN
COMMUNITY

## 2020-06-30 RX ORDER — METHOCARBAMOL 750 MG/1
500 TABLET, FILM COATED ORAL 4 TIMES DAILY
COMMUNITY

## 2020-06-30 RX ORDER — POTASSIUM CHLORIDE 750 MG/1
10 TABLET, EXTENDED RELEASE ORAL 2 TIMES DAILY
COMMUNITY

## 2020-06-30 RX ORDER — UBIDECARENONE 30 MG
30 CAPSULE ORAL 3 TIMES DAILY
COMMUNITY

## 2020-06-30 NOTE — PROGRESS NOTES
"Subjective:       Patient ID: Estela Pardo is a 70 y.o. female, Body mass index is 28.32 kg/m².    Chief Complaint: Dysphagia      Patient is new to me. Established patient of Dr. Lindsey.    GI Problem  The primary symptoms include abdominal pain (Chief complaint). Primary symptoms do not include fever, weight loss, fatigue, nausea, vomiting, diarrhea, melena, hematemesis, jaundice, hematochezia, dysuria or rash.   The abdominal pain began more than 2 days ago (Started 3-4 months ago; intermittent; describes as "aching"). The abdominal pain has been unchanged since its onset. The abdominal pain is located in the epigastric region and suprapubic region (epigastric worse). The abdominal pain does not radiate. The abdominal pain is relieved by nothing (aggravated by nothing; taking Dexilant daily- effective for GERD but not epigastric pain; recently started taking Carafate 1 gm QID- effective for epigastric pain).   The illness is also significant for dysphagia (recurred couple months ago; dysphagia to solids; denies trouble swallowing liquids; esophageal dilation improved symptoms in the past), bloating and constipation (started couple months ago; bowel movements are 2-3 times per week; taking percocet daily for chronic back pain; recently started on Linzess by PCP- effective). The illness does not include chills, anorexia or odynophagia. Significant associated medical issues include GERD. Associated medical issues do not include inflammatory bowel disease, gallstones, liver disease, alcohol abuse, PUD, gastric bypass, bowel resection, irritable bowel syndrome or hemorrhoids.     Review of Systems   Constitutional: Negative for appetite change, chills, fatigue, fever, unexpected weight change and weight loss.   HENT: Negative for trouble swallowing.    Respiratory: Negative for cough and shortness of breath.    Cardiovascular: Negative for chest pain.   Gastrointestinal: Positive for abdominal pain (Chief " complaint), bloating, constipation (started couple months ago; bowel movements are 2-3 times per week; taking percocet daily for chronic back pain; recently started on Linzess by PCP- effective) and dysphagia (recurred couple months ago; dysphagia to solids; denies trouble swallowing liquids; esophageal dilation improved symptoms in the past). Negative for anorexia, blood in stool, diarrhea, hematemesis, hematochezia, jaundice, melena, nausea and vomiting.   Genitourinary: Negative for difficulty urinating and dysuria.   Musculoskeletal: Negative for gait problem.   Skin: Negative for rash.   Neurological: Negative for speech difficulty.   Psychiatric/Behavioral: Negative for confusion.       Past Medical History:   Diagnosis Date    Anemia     history of sickle cell trait    Anticoagulant long-term use     Arthritis     rheumatoid    Chronic constipation     Colon polyp     Diabetes mellitus     medication induced    Esophageal stricture     Former smoker     Hypertension     RA (rheumatoid arthritis)     Sjoegren syndrome     Thyroid disease     hypothyroidism      Past Surgical History:   Procedure Laterality Date    COLONOSCOPY  4/9/2012    Dr. Lindsey at [facility]; internal hemorrhoids, colon polyp removed, repeat in 5 years for surveillance or prn    COLONOSCOPY N/A 2/17/2016    Dr. Lindsey; polyps removed; repeat in 5 years; bx: tubular adenoma    ESOPHAGOGASTRODUODENOSCOPY N/A 3/11/2019    Dr. Lindsey; mild schatzki ring- dilated; bx unremarkable    EYE SURGERY      cataract     JOINT REPLACEMENT      right knee replacement    JOINT REPLACEMENT      Left knee replacement     TOE SURGERY      pin little toe right foot    TUBAL LIGATION      UPPER GASTROINTESTINAL ENDOSCOPY  2015      Family History   Problem Relation Age of Onset    Liver cancer Sister       Wt Readings from Last 10 Encounters:   06/30/20 79.6 kg (175 lb 7.8 oz)   03/08/19 78 kg (172 lb)   02/07/18 83 kg  (183 lb)   02/07/17 80.7 kg (178 lb)   06/27/16 80.3 kg (177 lb)   02/15/16 80.3 kg (177 lb)   02/11/16 80.8 kg (178 lb 2.1 oz)   05/18/15 82.6 kg (182 lb)   01/21/14 82.6 kg (182 lb)   12/17/13 82.6 kg (182 lb)     Lab Results   Component Value Date    WBC 7.90 01/05/2010    HGB 12.5 01/05/2010    HCT 34.4 (L) 01/05/2010    MCV 81.1 (L) 01/05/2010     01/05/2010     CMP  Sodium   Date Value Ref Range Status   05/25/2009 143 136 - 145 mMol/l Final     Potassium   Date Value Ref Range Status   05/25/2009 4.6 3.5 - 5.1 mMol/l Final     Chloride   Date Value Ref Range Status   05/25/2009 104 95 - 110 mMol/l Final     CO2   Date Value Ref Range Status   05/25/2009 26 23.0 - 29.0 mEq/L Final     Glucose   Date Value Ref Range Status   05/25/2009 101 70 - 110 mg/dl Final     BUN, Bld   Date Value Ref Range Status   05/25/2009 21 (H) 6 - 20 mg/dl Final     Creatinine   Date Value Ref Range Status   05/25/2009 1.0 0.5 - 1.4 mg/dl Final     Calcium   Date Value Ref Range Status   05/25/2009 9.0 8.7 - 10.5 mg/dl Final     Total Protein   Date Value Ref Range Status   05/25/2009 7.2 6.0 - 8.4 gm/dl Final     Albumin   Date Value Ref Range Status   05/25/2009 4.4 3.5 - 5.2 g/dl Final     Total Bilirubin   Date Value Ref Range Status   05/25/2009 0.4 0.1 - 1.0 mg/dl Final     Comment:     For infants and newborns, interpretation of results should be based  on gestational age, weight and in agreement with clinical  observations.  .  Premature Infant recommended reference ranges:  Up to 24 hours.............<8.0 mg/dl  Up to 48 hours............<12.0 mg/dl  3-5 days..................<15.0 mg/dl  6-29 days.................<15.0 mg/dl       Alkaline Phosphatase   Date Value Ref Range Status   05/25/2009 55 55 - 135 U/L Final     AST   Date Value Ref Range Status   05/25/2009 26 10 - 40 U/L Final     ALT   Date Value Ref Range Status   05/25/2009 26 10 - 44 U/L Final       Lab Results   Component Value Date    TSH 1.36  05/25/2009          Reviewed prior medical records including endoscopy history (see surgical history).     Objective:      Physical Exam  Constitutional:       General: She is not in acute distress.     Appearance: She is well-developed.   HENT:      Head: Normocephalic.      Right Ear: Hearing normal.      Left Ear: Hearing normal.      Nose: Nose normal.      Mouth/Throat:      Comments: Pt wearing mask due to COVID concerns  Eyes:      General: Lids are normal.      Conjunctiva/sclera: Conjunctivae normal.      Pupils: Pupils are equal, round, and reactive to light.   Neck:      Musculoskeletal: Normal range of motion.      Trachea: Trachea normal.   Cardiovascular:      Rate and Rhythm: Normal rate and regular rhythm.      Heart sounds: Normal heart sounds. No murmur.   Pulmonary:      Effort: Pulmonary effort is normal. No respiratory distress.      Breath sounds: Normal breath sounds. No stridor. No wheezing.   Abdominal:      General: Bowel sounds are normal. There is no distension.      Palpations: Abdomen is soft. There is no mass.      Tenderness: There is no abdominal tenderness. There is no guarding or rebound.   Musculoskeletal: Normal range of motion.   Skin:     General: Skin is warm and dry.      Findings: No rash.      Comments: Non jaundiced   Neurological:      Mental Status: She is alert and oriented to person, place, and time.   Psychiatric:         Speech: Speech normal.         Behavior: Behavior normal. Behavior is cooperative.           Assessment:       1. Epigastric pain    2. Esophageal dysphagia    3. History of Schatzki's ring    4. Gastroesophageal reflux disease without esophagitis    5. Drug-induced constipation    6. Change in bowel habits    7. Opioid use    8. History of colon polyps           Plan:   All diagnoses and orders for this visit:    Epigastric pain  - Hepatic function panel; Future; Expected date: 06/30/2020  - Lipase; Future; Expected date: 06/30/2020  - US Abdomen  Complete; Future; Expected date: 06/30/2020  - Schedule EGD, discussed procedure with patient, including risks and benefits, patient verbalized understanding  - Continue Dexilant 60 mg once daily  - Continue Carafate 1 gm QID    Esophageal dysphagia & History of Schatzki's ring   - Schedule EGD, discussed procedure with patient and possible esophageal dilation may be performed during procedure if indicated, patient verbalized understanding   - Educated patient to eat smaller more frequent meals and to eat slowly and advised to eat a soft diet.   - Possible UGI/esophagram/esophageal manometry if symptoms persist    Gastroesophageal reflux disease without esophagitis   - Continue Dexilant 60 mg once daily   - Take PPI in the morning 30 minutes before breakfast   - Recommend to avoid large meals, avoid eating within 3 hours of bedtime, elevate head of bed if nocturnal symptoms are present, smoking cessation (if current smoker), & weight loss (if overweight).    - Recommend minimize/avoid high-fat foods, chocolate, caffeine, citrus, alcohol, & tomato products.   - Advised to avoid/limit use of NSAID's, since they can cause GI upset, bleeding, and/or ulcers. If needed, take with food.     Drug-induced constipation & Change in bowel habits   - Schedule Colonoscopy, discussed procedure with the patient, including risks and benefits, patient verbalized understanding   - Continue Linzess 145 mcg once daily   - Recommend daily exercise as tolerated, adequate water intake, and high fiber diet.    - Recommend OTC fiber supplement   - Recommend OTC stool softener     Opioid use   - Discussed with patient that a side effect of narcotic pain medications is constipation, advised patient to talk to provider who manages pain medication, patient verbalized understanding    History of colon polyps    If no improvement in symptoms or symptoms worsen, call/follow-up at clinic or go to ER

## 2020-06-30 NOTE — PATIENT INSTRUCTIONS
Soft Diet  Your healthcare provider has prescribed a soft diet (also called gastrointestinal soft diet). This means eating foods that are soft, low in fiber, and easy to digest. This diet is for people with digestive problems. A soft diet provides foods that are easy to chew and swallow. Foods should be bite-size and very soft or moist. Follow your healthcare providers specific instructions about what foods and drinks you may have. The general guidelines below can help you get started on this diet.       Beverages  OK: Milk, tea, coffee, fruit juices, carbonated beverages, nutrition shakes, and drinks (Note: Thin liquids may be hard to swallow. They may need to be thickened.)  Avoid: None, unless they need to be thickened  Breads and crackers  OK: Refined white, wheat, or seedless rye bread; kristen or soda crackers that have been moistened; plain rolls or bagels; very soft tortillas  Avoid: Whole-grain breads, rolls, or bagels with nuts, raisins, or seeds; crackers, croutons, taco shells  Cereals and grains  OK: Cooked cereals, plain dry cereals that have been moistened, plain macaroni, spaghetti, noodles, rice  Avoid: Whole-grain cereals and granola, or cereals containing bran, raisins, seeds or nuts; coconut; brown or wild rice  Desserts and sweets  OK: Moist cake; soft fruit pie with bottom crust only; soft cookies moistened in milk or other liquid; gelatin, custard, pudding, plain ice cream, plain sherbet, sugar, honey, clear jelly  Avoid: Pastries, desserts, and ice cream that have nuts, coconut, seeds, or dried fruit; popcorn; chips of any kind including potato chips and tortilla chips; jam, marmalade  Eggs and cheese  OK: Poached, soft boiled, or scrambled eggs; cottage cheese, ricotta cheese, cream cheese, cheese sauces, or cheese melted in other dishes  Avoid: Crisp fried eggs, cheese slices and cubes  Fruits  OK: Avocado, banana, baked peeled apple, applesauce, peeled ripe peaches or pears, canned fruit  (apricots, cherries, peaches, pears), melons  Avoid: Raw apple, dried fruits, coconut, pineapple, grapes, fruit juan, fruit snacks  Meat and fish  OK: All fresh meat, poultry, or fish that is cooked until tender  Avoid: Meat, fish, or poultry that is fried; tough or stringy meat including johnson, sausage, bratwurst, jerky, corned beef  Other protein foods  OK: Tofu, baked beans, smooth peanut butter or other nut or seed butters  Avoid: Deep-fried tofu; crunchy peanut or other nut or seed butters; nuts or seeds that are whole or chopped  Soups  OK: All soups, but they may need to be thickened. Thin liquid may be too hard to swallow.  Avoid: Soups made with stringy meat pieces or chunky vegetables  Vegetables  OK: Peeled and well-cooked potatoes or sweet potatoes; fresh, cooked, canned, or frozen vegetables without seeds, skin, or coarse fiber  Avoid: Raw vegetables, deep-fried vegetables (such as tempura), and corn  Date Last Reviewed: 8/1/2016 © 2000-2017 [employer]. [address] [place] [address]. All rights reserved. This information is not intended as a substitute for professional medical care. Always follow your healthcare professional's instructions.        Epigastric Pain (Uncertain Cause)     Epigastric pain can be a sign of disease in the upper abdomen. Common causes include:  · Acid reflux (stomach acid flowing up into the esophagus)  · Gastritis (irritation of the stomach lining)  · Peptic Ulcer Disease  · Inflammation of the pancreas  · Gallstone  · Infection in the gallbladder  Pain may be dull or burning. It may spread upward to the chest or to the back. There may be other symptoms such as belching, bloating, cramps or hunger pains. There may be weight loss or poor appetite, nausea or vomiting.  Since the diagnosis of your pain is not certain yet, further tests may sometimes be needed. Sometimes the doctor will treat you for the most likely condition to see if there is  improvement before doing further tests.  Home care  Medicines  · Antacids help neutralize the normal acids in your stomach. Examples are Maalox, Mylanta, Rolaids, and Tums. If you dont like the liquid, you can also try a chewable one. You may find one works better than another for you. Overuse can cause diarrhea or constipation.  · Acid blockers (H2 blockers) decrease acid production. Examples are cimetidine (Tagamet), famotidine (Pepcid) and ranitidine (Zantac).  · Acid inhibitors (PPIs) decrease acid production in a different way than the blockers. You may find they work better, but can take a little longer to take effect.  Examples are omeprazole (Prilosec), lansoprazole (Prevacid), pantoprazole (Protonix), rabeprazole (Aciphex), and esomeprazole (Nexium).  · Take an antacid 30-60 minutes after eating and at bedtime, but not at the same time as an acid blocker.  · Try not to take NSAIDs. Aspirin may also cause problems, but if taking it for your heart or other medical reasons, talk to your doctor before stopping it; you do not want to cause a worse problem, like a heart attack or stroke.  Diet  · If certain foods seem to cause your spasm, try to avoid them.   · Eat slowly and chew food well before swallowing. Symptoms of gastritis can be worsened by certain foods. Limit or avoid fatty, fried, and spicy foods, as well as coffee, chocolate, mint, and foods with high acid content such as tomatoes and citrus fruit and juices (orange, grapefruit, lemon).  · Avoid alcohol, caffeine, and tobacco, which can delay healing and worsen your problem.  · Try eating smaller meals with snacks in between  Follow-up care  Follow up with your healthcare provider or as advised.  When to seek medical advice  Call your healthcare provider right away if any of the following occur:  · Stomach pain worsens or moves to the right lower part of the abdomen  · Chest pain appears, or if it worsens or spreads to the chest, back, neck,  shoulder, or arm  · Frequent vomiting (cant keep down liquids)  · Blood in the stool or vomit (red or black color)  · Feeling weak or dizzy, fainting, or having trouble breathing  · Fever of 100.4ºF (38ºC) or higher, or as directed by your healthcare provider  · Abdominal swelling  Date Last Reviewed: 9/25/2015  © 1574-0994 [employer]. [address] [place] [address], [place] [address]. All rights reserved. This information is not intended as a substitute for professional medical care. Always follow your healthcare professional's instructions.

## 2020-07-06 ENCOUNTER — TELEPHONE (OUTPATIENT)
Dept: GASTROENTEROLOGY | Facility: CLINIC | Age: 70
End: 2020-07-06

## 2020-07-06 ENCOUNTER — HOSPITAL ENCOUNTER (OUTPATIENT)
Dept: RADIOLOGY | Facility: HOSPITAL | Age: 70
Discharge: HOME | End: 2020-07-06
Attending: NURSE PRACTITIONER
Payer: MEDICARE

## 2020-07-06 DIAGNOSIS — R10.13 EPIGASTRIC PAIN: ICD-10-CM

## 2020-07-06 PROCEDURE — 76700 US EXAM ABDOM COMPLETE: CPT | Mod: 26,,, | Performed by: RADIOLOGY

## 2020-07-06 PROCEDURE — 76700 US ABDOMEN COMPLETE: ICD-10-PCS | Mod: 26,,, | Performed by: RADIOLOGY

## 2020-07-06 PROCEDURE — 76700 US EXAM ABDOM COMPLETE: CPT | Mod: TC,PO

## 2020-07-07 ENCOUNTER — TELEPHONE (OUTPATIENT)
Dept: DERMATOLOGY | Facility: CLINIC | Age: 70
End: 2020-07-07

## 2020-07-20 DIAGNOSIS — Z01.812 PRE-PROCEDURE LAB EXAM: ICD-10-CM

## 2020-07-25 ENCOUNTER — LAB VISIT (OUTPATIENT)
Dept: FAMILY MEDICINE | Facility: CLINIC | Age: 70
End: 2020-07-25
Payer: MEDICARE

## 2020-07-25 DIAGNOSIS — Z01.812 PRE-PROCEDURE LAB EXAM: ICD-10-CM

## 2020-07-25 PROCEDURE — U0003 INFECTIOUS AGENT DETECTION BY NUCLEIC ACID (DNA OR RNA); SEVERE ACUTE RESPIRATORY SYNDROME CORONAVIRUS 2 (SARS-COV-2) (CORONAVIRUS DISEASE [COVID-19]), AMPLIFIED PROBE TECHNIQUE, MAKING USE OF HIGH THROUGHPUT TECHNOLOGIES AS DESCRIBED BY CMS-2020-01-R: HCPCS

## 2020-07-26 LAB — SARS-COV-2 RNA RESP QL NAA+PROBE: NOT DETECTED

## 2020-07-27 ENCOUNTER — ANESTHESIA EVENT (OUTPATIENT)
Dept: ENDOSCOPY | Facility: HOSPITAL | Age: 70
End: 2020-07-27
Payer: MEDICARE

## 2020-07-28 ENCOUNTER — HOSPITAL ENCOUNTER (OUTPATIENT)
Facility: HOSPITAL | Age: 70
Discharge: HOME | End: 2020-07-28
Attending: INTERNAL MEDICINE | Admitting: INTERNAL MEDICINE
Payer: MEDICARE

## 2020-07-28 ENCOUNTER — ANESTHESIA (OUTPATIENT)
Dept: ENDOSCOPY | Facility: HOSPITAL | Age: 70
End: 2020-07-28
Payer: MEDICARE

## 2020-07-28 DIAGNOSIS — R13.10 DYSPHAGIA: ICD-10-CM

## 2020-07-28 LAB — GLUCOSE SERPL-MCNC: 130 MG/DL (ref 70–110)

## 2020-07-28 PROCEDURE — 63600175 PHARM REV CODE 636 W HCPCS: Mod: PO | Performed by: INTERNAL MEDICINE

## 2020-07-28 PROCEDURE — 27201089 HC SNARE, DISP (ANY): Mod: PO | Performed by: INTERNAL MEDICINE

## 2020-07-28 PROCEDURE — 45385 COLONOSCOPY W/LESION REMOVAL: CPT | Mod: ,,, | Performed by: INTERNAL MEDICINE

## 2020-07-28 PROCEDURE — 25000003 PHARM REV CODE 250: Mod: PO | Performed by: NURSE ANESTHETIST, CERTIFIED REGISTERED

## 2020-07-28 PROCEDURE — 43248 PR EGD, FLEX, W/DILATION OVER GUIDEWIRE: ICD-10-PCS | Mod: 51,,, | Performed by: INTERNAL MEDICINE

## 2020-07-28 PROCEDURE — 88305 TISSUE EXAM BY PATHOLOGIST: CPT | Performed by: PATHOLOGY

## 2020-07-28 PROCEDURE — 43248 EGD GUIDE WIRE INSERTION: CPT | Mod: 51,,, | Performed by: INTERNAL MEDICINE

## 2020-07-28 PROCEDURE — D9220A PRA ANESTHESIA: ICD-10-PCS | Mod: CRNA,,, | Performed by: NURSE ANESTHETIST, CERTIFIED REGISTERED

## 2020-07-28 PROCEDURE — 88305 TISSUE EXAM BY PATHOLOGIST: CPT | Mod: 26,,, | Performed by: PATHOLOGY

## 2020-07-28 PROCEDURE — 88305 TISSUE EXAM BY PATHOLOGIST: ICD-10-PCS | Mod: 26,,, | Performed by: PATHOLOGY

## 2020-07-28 PROCEDURE — 43239 EGD BIOPSY SINGLE/MULTIPLE: CPT | Mod: 59,,, | Performed by: INTERNAL MEDICINE

## 2020-07-28 PROCEDURE — 82962 GLUCOSE BLOOD TEST: CPT | Mod: PO | Performed by: INTERNAL MEDICINE

## 2020-07-28 PROCEDURE — 43239 EGD BIOPSY SINGLE/MULTIPLE: CPT | Mod: PO | Performed by: INTERNAL MEDICINE

## 2020-07-28 PROCEDURE — 27201012 HC FORCEPS, HOT/COLD, DISP: Mod: PO | Performed by: INTERNAL MEDICINE

## 2020-07-28 PROCEDURE — D9220A PRA ANESTHESIA: Mod: CRNA,,, | Performed by: NURSE ANESTHETIST, CERTIFIED REGISTERED

## 2020-07-28 PROCEDURE — 43248 EGD GUIDE WIRE INSERTION: CPT | Mod: PO | Performed by: INTERNAL MEDICINE

## 2020-07-28 PROCEDURE — 45385 PR COLONOSCOPY,REMV LESN,SNARE: ICD-10-PCS | Mod: ,,, | Performed by: INTERNAL MEDICINE

## 2020-07-28 PROCEDURE — 43239 PR EGD, FLEX, W/BIOPSY, SGL/MULTI: ICD-10-PCS | Mod: 59,,, | Performed by: INTERNAL MEDICINE

## 2020-07-28 PROCEDURE — D9220A PRA ANESTHESIA: ICD-10-PCS | Mod: ANES,,, | Performed by: ANESTHESIOLOGY

## 2020-07-28 PROCEDURE — D9220A PRA ANESTHESIA: Mod: ANES,,, | Performed by: ANESTHESIOLOGY

## 2020-07-28 PROCEDURE — 63600175 PHARM REV CODE 636 W HCPCS: Mod: PO | Performed by: NURSE ANESTHETIST, CERTIFIED REGISTERED

## 2020-07-28 PROCEDURE — 37000008 HC ANESTHESIA 1ST 15 MINUTES: Mod: PO | Performed by: INTERNAL MEDICINE

## 2020-07-28 PROCEDURE — 37000009 HC ANESTHESIA EA ADD 15 MINS: Mod: PO | Performed by: INTERNAL MEDICINE

## 2020-07-28 PROCEDURE — 45385 COLONOSCOPY W/LESION REMOVAL: CPT | Mod: PO | Performed by: INTERNAL MEDICINE

## 2020-07-28 RX ORDER — PROPOFOL 10 MG/ML
VIAL (ML) INTRAVENOUS CONTINUOUS PRN
Status: DISCONTINUED | OUTPATIENT
Start: 2020-07-28 | End: 2020-07-28

## 2020-07-28 RX ORDER — GLYCOPYRROLATE 0.2 MG/ML
INJECTION INTRAMUSCULAR; INTRAVENOUS
Status: DISCONTINUED | OUTPATIENT
Start: 2020-07-28 | End: 2020-07-28

## 2020-07-28 RX ORDER — PROPOFOL 10 MG/ML
VIAL (ML) INTRAVENOUS
Status: DISCONTINUED | OUTPATIENT
Start: 2020-07-28 | End: 2020-07-28

## 2020-07-28 RX ORDER — LIDOCAINE HYDROCHLORIDE 20 MG/ML
INJECTION INTRAVENOUS
Status: DISCONTINUED | OUTPATIENT
Start: 2020-07-28 | End: 2020-07-28

## 2020-07-28 RX ORDER — SODIUM CHLORIDE, SODIUM LACTATE, POTASSIUM CHLORIDE, CALCIUM CHLORIDE 600; 310; 30; 20 MG/100ML; MG/100ML; MG/100ML; MG/100ML
INJECTION, SOLUTION INTRAVENOUS CONTINUOUS
Status: DISCONTINUED | OUTPATIENT
Start: 2020-07-28 | End: 2020-07-28 | Stop reason: HOSPADM

## 2020-07-28 RX ORDER — SODIUM CHLORIDE 0.9 % (FLUSH) 0.9 %
10 SYRINGE (ML) INJECTION
Status: DISCONTINUED | OUTPATIENT
Start: 2020-07-28 | End: 2020-07-28 | Stop reason: HOSPADM

## 2020-07-28 RX ADMIN — SODIUM CHLORIDE, SODIUM LACTATE, POTASSIUM CHLORIDE, AND CALCIUM CHLORIDE: .6; .31; .03; .02 INJECTION, SOLUTION INTRAVENOUS at 08:07

## 2020-07-28 RX ADMIN — PROPOFOL 80 MG: 10 INJECTION, EMULSION INTRAVENOUS at 09:07

## 2020-07-28 RX ADMIN — PROPOFOL 150 MCG/KG/MIN: 10 INJECTION, EMULSION INTRAVENOUS at 09:07

## 2020-07-28 RX ADMIN — LIDOCAINE HYDROCHLORIDE 100 MG: 20 INJECTION, SOLUTION INTRAVENOUS at 09:07

## 2020-07-28 RX ADMIN — GLYCOPYRROLATE 0.2 MG: 0.2 INJECTION, SOLUTION INTRAMUSCULAR; INTRAVENOUS at 09:07

## 2020-07-28 NOTE — [employer] PATIENT INSTRUCTIONS
Discharge Summary/Instructions after an Endoscopic Procedure  Patient Name: Estela Pardo  Patient MRN: [id number]  Patient YOB: 1950  Tuesday, July 28, 2020  Bartolo Lindsey MD  RESTRICTIONS:  During your procedure today, you received medications for sedation.  These   medications may affect your judgment, balance and coordination.  Therefore,   for 24 hours, you have the following restrictions:   - DO NOT drive a car, operate machinery, make legal/financial decisions,   sign important papers or drink alcohol.    ACTIVITY:  Today: no heavy lifting, straining or running due to procedural   sedation/anesthesia.  The following day: return to full activity including work.  DIET:  Eat and drink normally unless instructed otherwise.     TREATMENT FOR COMMON SIDE EFFECTS:  - Mild abdominal pain, nausea, belching, bloating or excessive gas:  rest,   eat lightly and use a heating pad.  - Sore Throat: treat with throat lozenges and/or gargle with warm salt   water.  - Because air was used during the procedure, expelling large amounts of air   from your rectum or belching is normal.  - If a bowel prep was taken, you may not have a bowel movement for 1-3 days.    This is normal.  SYMPTOMS TO WATCH FOR AND REPORT TO YOUR PHYSICIAN:  1. Abdominal pain or bloating, other than gas cramps.  2. Chest pain.  3. Back pain.  4. Signs of infection such as: chills or fever occurring within 24 hours   after the procedure.  5. Rectal bleeding, which would show as bright red, maroon, or black stools.   (A tablespoon of blood from the rectum is not serious, especially if   hemorrhoids are present.)  6. Vomiting.  7. Weakness or dizziness.  GO DIRECTLY TO THE NEAREST EMERGENCY ROOM IF YOU HAVE ANY OF THE FOLLOWING:      Difficulty breathing              Chills and/or fever over 101 F   Persistent vomiting and/or vomiting blood   Severe abdominal pain   Severe chest pain   Black, tarry stools   Bleeding- more than one  tablespoon   Any other symptom or condition that you feel may need urgent attention  Your doctor recommends these additional instructions:  If any biopsies were taken, your doctors clinic will contact you in 1 to 2   weeks with any results.  We are waiting for your pathology results.   Continue your present medications.   You are being discharged to home.  For questions, problems or results please call your physician - Bartolo Lindsey MD at Work:  [phone].  EMERGENCY PHONE NUMBER: [phone], LAB RESULTS: [phone]  IF A COMPLICATION OR EMERGENCY SITUATION ARISES AND YOU ARE UNABLE TO REACH   YOUR PHYSICIAN - GO DIRECTLY TO THE EMERGENCY ROOM.  ___________________________________________  Nurse Signature  ___________________________________________  Patient/Designated Responsible Party Signature  Bartolo Lindsey MD  7/28/2020 9:17:30 AM  This report has been verified and signed electronically.  [employer]

## 2020-07-28 NOTE — TRANSFER OF CARE
"Anesthesia Transfer of Care Note    Patient: Estela Pardo    Procedure(s) Performed: Procedure(s) (LRB):  EGD (ESOPHAGOGASTRODUODENOSCOPY) (N/A)  COLONOSCOPY (N/A)    Patient location: PACU    Anesthesia Type: general    Transport from OR: Transported from OR on 2-3 L/min O2 by NC with adequate spontaneous ventilation    Post pain: adequate analgesia    Post assessment: no apparent anesthetic complications and tolerated procedure well    Post vital signs: stable    Level of consciousness: alert, awake and oriented    Nausea/Vomiting: no nausea/vomiting    Complications: none    Transfer of care protocol was followed      Last vitals:   Visit Vitals  BP (!) 164/74 (BP Location: Right arm, Patient Position: Lying)   Pulse 66   Temp 36.5 °C (97.7 °F) (Skin)   Resp 15   Ht 5' 6" (1.676 m)   Wt 78.5 kg (173 lb)   SpO2 97%   Breastfeeding No   BMI 27.92 kg/m²     "

## 2020-07-28 NOTE — ANESTHESIA POSTPROCEDURE EVALUATION
Anesthesia Post Evaluation    Patient: Estela Pardo    Procedure(s) Performed: Procedure(s) (LRB):  EGD (ESOPHAGOGASTRODUODENOSCOPY) (N/A)  COLONOSCOPY (N/A)    Final Anesthesia Type: general    Patient location during evaluation: PACU  Patient participation: Yes- Able to Participate  Level of consciousness: awake and alert and oriented  Post-procedure vital signs: reviewed and stable  Pain management: adequate  Airway patency: patent    PONV status at discharge: No PONV  Anesthetic complications: no      Cardiovascular status: blood pressure returned to baseline  Respiratory status: unassisted, spontaneous ventilation and room air  Hydration status: euvolemic  Follow-up not needed.          Vitals Value Taken Time   /60 07/28/20 0935   Temp 36.6 °C (97.8 °F) 07/28/20 0935   Pulse 74 07/28/20 0935   Resp 16 07/28/20 0935   SpO2 98 % 07/28/20 0935         Event Time   Out of Recovery 10:03:04         Pain/Kathy Score: Kathy Score: 10 (7/28/2020 10:03 AM)

## 2020-07-28 NOTE — [employer] PATIENT INSTRUCTIONS
Discharge Summary/Instructions after an Endoscopic Procedure  Patient Name: Estela Pardo  Patient MRN: [id number]  Patient YOB: 1950  Tuesday, July 28, 2020  Bartolo Lindsey MD  RESTRICTIONS:  During your procedure today, you received medications for sedation.  These   medications may affect your judgment, balance and coordination.  Therefore,   for 24 hours, you have the following restrictions:   - DO NOT drive a car, operate machinery, make legal/financial decisions,   sign important papers or drink alcohol.    ACTIVITY:  Today: no heavy lifting, straining or running due to procedural   sedation/anesthesia.  The following day: return to full activity including work.  DIET:  Eat and drink normally unless instructed otherwise.     TREATMENT FOR COMMON SIDE EFFECTS:  - Mild abdominal pain, nausea, belching, bloating or excessive gas:  rest,   eat lightly and use a heating pad.  - Sore Throat: treat with throat lozenges and/or gargle with warm salt   water.  - Because air was used during the procedure, expelling large amounts of air   from your rectum or belching is normal.  - If a bowel prep was taken, you may not have a bowel movement for 1-3 days.    This is normal.  SYMPTOMS TO WATCH FOR AND REPORT TO YOUR PHYSICIAN:  1. Abdominal pain or bloating, other than gas cramps.  2. Chest pain.  3. Back pain.  4. Signs of infection such as: chills or fever occurring within 24 hours   after the procedure.  5. Rectal bleeding, which would show as bright red, maroon, or black stools.   (A tablespoon of blood from the rectum is not serious, especially if   hemorrhoids are present.)  6. Vomiting.  7. Weakness or dizziness.  GO DIRECTLY TO THE NEAREST EMERGENCY ROOM IF YOU HAVE ANY OF THE FOLLOWING:      Difficulty breathing              Chills and/or fever over 101 F   Persistent vomiting and/or vomiting blood   Severe abdominal pain   Severe chest pain   Black, tarry stools   Bleeding- more than one  tablespoon   Any other symptom or condition that you feel may need urgent attention  Your doctor recommends these additional instructions:  If any biopsies were taken, your doctors clinic will contact you in 1 to 2   weeks with any results.  We are waiting for your pathology results.   Your physician has recommended a repeat colonoscopy in five years for   surveillance based on pathology results.   You are being discharged to home.  For questions, problems or results please call your physician - Bartolo Lindsey MD at Work:  [phone].  EMERGENCY PHONE NUMBER: [phone], LAB RESULTS: [phone]  IF A COMPLICATION OR EMERGENCY SITUATION ARISES AND YOU ARE UNABLE TO REACH   YOUR PHYSICIAN - GO DIRECTLY TO THE EMERGENCY ROOM.  ___________________________________________  Nurse Signature  ___________________________________________  Patient/Designated Responsible Party Signature  Bartolo Lindsey MD  7/28/2020 9:33:19 AM  This report has been verified and signed electronically.  [employer]

## 2020-07-28 NOTE — H&P
History & Physical - Short Stay  Gastroenterology      SUBJECTIVE:     Procedure: Colonoscopy and EGD    Chief Complaint/Indication for Procedure: Abdominal Pain, Dysphagia, Change in Bowel Habits and Previous Polyps    PTA Medications   Medication Sig    abatacept (ORENCIA) 125 mg/mL Syrg Inject 125 mg into the skin every 7 days.    aspirin 81 mg Tab Take 1 tablet by mouth once daily. Every day    bifidobacterium infantis (ALIGN) 4 mg Cap Take by mouth.    biotin 300 mcg Tab Take 1 tablet by mouth once daily.      calcium citrate-vitamin D3 315-200 mg (CITRACAL+D) 315-200 mg-unit per tablet Take 1 tablet by mouth 2 (two) times daily.      clonidine (CATAPRES) 0.1 MG tablet Take 0.1 mg by mouth 2 (two) times daily.    co-enzyme Q-10 30 mg capsule Take 30 mg by mouth 3 (three) times daily.    dexlansoprazole (DEXILANT) 60 mg capsule Take 1 capsule by mouth Daily.    diltiazem (CARDIZEM LA) 360 mg 24 hr tablet Take 360 mg by mouth once daily. Every day    duloxetine (CYMBALTA) 60 MG capsule Take 60 mg by mouth once daily.      fish oil-omega-3 fatty acids 300-1,000 mg capsule Take 2 g by mouth once daily.      gabapentin (NEURONTIN) 300 MG capsule Take 600 mg by mouth 2 (two) times daily. B.I.D. and two at bedtime    garlic 1 mg Cap Take 1 tablet by mouth once daily.     hydrALAZINE (APRESOLINE) 25 MG tablet Take 25 mg by mouth 3 (three) times daily.    levothyroxine (SYNTHROID) 50 MCG tablet Take 50 mcg by mouth once daily.      linaCLOtide (LINZESS) 145 mcg Cap capsule Take 145 mcg by mouth once daily.    LORazepam (ATIVAN) 0.5 MG tablet Take 0.5 mg by mouth every 6 (six) hours as needed for Anxiety.    metformin (GLUCOPHAGE) 500 MG tablet Take 500 mg by mouth daily with breakfast.     methocarbamoL (ROBAXIN) 750 MG Tab Take 500 mg by mouth 4 (four) times daily.    oxycodone-acetaminophen (PERCOCET)  mg per tablet Take 1 tablet by mouth daily as needed for Pain.    potassium chloride SA  (K-DUR,KLOR-CON) 10 MEQ tablet Take 10 mEq by mouth 2 (two) times daily.    predniSONE (DELTASONE) 5 MG tablet 5 mg. Every day    sucralfate (CARAFATE) 100 mg/mL suspension Take 1 g by mouth 4 (four) times daily with meals and nightly.    valsartan (DIOVAN) 320 MG tablet Take 320 mg by mouth once daily.    vitamin D 1000 units Tab Take 185 mg by mouth once daily.       Review of patient's allergies indicates:   Allergen Reactions    Methotrexate Other (See Comments)     Other reaction(s): abscess under arms/ GI discomfort    Nsaids (non-steroidal anti-inflammatory drug) Other (See Comments)     GI upset        Past Medical History:   Diagnosis Date    Anemia     history of sickle cell trait    Anticoagulant long-term use     Arthritis     rheumatoid    Chronic constipation     Colon polyp     Diabetes mellitus     medication induced    Esophageal stricture     Former smoker     Hypertension     RA (rheumatoid arthritis)     Sjoegren syndrome     Thyroid disease     hypothyroidism     Past Surgical History:   Procedure Laterality Date    CERVICAL SPINE SURGERY  08/2019    COLONOSCOPY  4/9/2012    Dr. Lindsey at [facility]; internal hemorrhoids, colon polyp removed, repeat in 5 years for surveillance or prn    COLONOSCOPY N/A 2/17/2016    Dr. Lindsey; polyps removed; repeat in 5 years; bx: tubular adenoma    ESOPHAGOGASTRODUODENOSCOPY N/A 3/11/2019    Dr. Lindsey; mild schatzki ring- dilated; bx unremarkable    EYE SURGERY      cataract     JOINT REPLACEMENT      right knee replacement    JOINT REPLACEMENT      Left knee replacement     TOE SURGERY      pin little toe right foot    TUBAL LIGATION      UPPER GASTROINTESTINAL ENDOSCOPY  2015     Family History   Problem Relation Age of Onset    Liver cancer Sister      Social History     Tobacco Use    Smoking status: Former Smoker     Packs/day: 0.20     Years: 10.00     Pack years: 2.00     Types: Cigarettes     Quit date:  1985     Years since quittin.2    Smokeless tobacco: Never Used   Substance Use Topics    Alcohol use: No    Drug use: No     Types: Oxycodone         OBJECTIVE:     Vital Signs (Most Recent)  Temp: 97.7 °F (36.5 °C) (20)  Pulse: 66 (20)  Resp: 15 (20)  BP: (!) 164/74 (20)  SpO2: 97 % (20)    Physical Exam:                                                       GENERAL:  Comfortable, in no acute distress.                                 HEENT EXAM:  Nonicteric.  No adenopathy.  Oropharynx is clear.               NECK:  Supple.                                                               LUNGS:  Clear.                                                               CARDIAC:  Regular rate and rhythm.  S1, S2.  No murmur.                      ABDOMEN:  Soft, positive bowel sounds, nontender.  No hepatosplenomegaly or masses.  No rebound or guarding.                                             EXTREMITIES:  No edema.     MENTAL STATUS:  Normal, alert and oriented.      ASSESSMENT/PLAN:     Assessment: Abdominal Pain, Dysphagia, Change in Bowel Habits and Previous Polyps    Plan: Colonoscopy and EGD    Anesthesia Plan: General    ASA Grade: ASA 2 - Patient with mild systemic disease with no functional limitations    MALLAMPATI SCORE:  I (soft palate, uvula, fauces, and tonsillar pillars visible)     In my medical opinion and judgment, this medical or surgical procedure was not able to be safely postponed in accordance with [place] Department [facility], Healthcare Facility Notice #2020-[id number]-007.

## 2020-07-28 NOTE — ANESTHESIA PREPROCEDURE EVALUATION
07/28/2020  Estela Pardo is a 70 y.o., female.    Pre-op Assessment    I have reviewed the Patient Summary Reports.     I have reviewed the Nursing Notes. I have reviewed the NPO Status.   I have reviewed the Medications.   Steroids Taken In Past Year: Prednisone    Review of Systems  Anesthesia Hx:  No problems with previous Anesthesia    Social:  Non-Smoker, No Alcohol Use    Hematology/Oncology:        Hematology Comments: Sickle cell trait   EENT/Dental:   Sjogren syndrome   Cardiovascular:   Hypertension    Pulmonary:  Pulmonary Normal    Renal/:  Renal/ Normal     Hepatic/GI:  Hepatic/GI Normal    Musculoskeletal:   Arthritis (rheumatoid arthritis)     Neurological:   Chronic Pain Syndrome   Endocrine:   Diabetes Hypothyroidism        Physical Exam  General:  Well nourished    Airway/Jaw/Neck:  Airway Findings: Mouth Opening: Normal Tongue: Normal  General Airway Assessment: Adult  Mallampati: II  Improves to II with phonation.  TM Distance: 4-6 cm  Jaw/Neck Findings:  Neck ROM: Extension Decreased, Mild      Dental:  Dental Findings: In tact   Chest/Lungs:  Chest/Lungs Findings: Clear to auscultation, Normal Respiratory Rate     Heart/Vascular:  Heart Findings: Rate: Normal  Rhythm: Regular Rhythm  Sounds: Normal  Heart murmur: negative Vascular Findings: Normal (No carotid bruits.)       Mental Status:  Mental Status Findings:  Cooperative, Alert and Oriented         Anesthesia Plan  Type of Anesthesia, risks & benefits discussed:  Anesthesia Type:  general  Patient's Preference:   Intra-op Monitoring Plan: standard ASA monitors  Intra-op Monitoring Plan Comments:   Post Op Pain Control Plan:   Post Op Pain Control Plan Comments:   Induction:   IV  Beta Blocker:  Patient is not currently on a Beta-Blocker (No further documentation required).       Informed Consent: Patient understands risks and  agrees with Anesthesia plan.  Questions answered. Anesthesia consent signed with patient.  ASA Score: 3     Day of Surgery Review of History & Physical: I have interviewed and examined the patient. I have reviewed the patient's H&P dated:  There are no significant changes.  H&P update referred to the surgeon.  H&P completed by Anesthesiologist.   Anesthesia Plan Notes: Propofol general.        Ready For Surgery From Anesthesia Perspective.

## 2020-07-28 NOTE — DISCHARGE INSTRUCTIONS

## 2020-07-28 NOTE — DISCHARGE SUMMARY
Discharge Note  Short Stay      SUMMARY     Admit Date: 7/28/2020    Attending Physician: Bartolo Lindsey MD     Discharge Physician: Bartolo Lindsey MD    Discharge Date: 7/28/2020 9:34 AM    Final Diagnosis: Dysphagia, unspecified type [R13.10]  Abdominal pain, unspecified abdominal location [R10.9]  Constipation, unspecified constipation type [K59.00]  Hx of colonic polyps [Z86.010]    Disposition: HOME OR SELF CARE    Patient Instructions:   Current Discharge Medication List      CONTINUE these medications which have NOT CHANGED    Details   abatacept (ORENCIA) 125 mg/mL Syrg Inject 125 mg into the skin every 7 days.      aspirin 81 mg Tab Take 1 tablet by mouth once daily. Every day      bifidobacterium infantis (ALIGN) 4 mg Cap Take by mouth.      biotin 300 mcg Tab Take 1 tablet by mouth once daily.        calcium citrate-vitamin D3 315-200 mg (CITRACAL+D) 315-200 mg-unit per tablet Take 1 tablet by mouth 2 (two) times daily.        clonidine (CATAPRES) 0.1 MG tablet Take 0.1 mg by mouth 2 (two) times daily.      co-enzyme Q-10 30 mg capsule Take 30 mg by mouth 3 (three) times daily.      dexlansoprazole (DEXILANT) 60 mg capsule Take 1 capsule by mouth Daily.      diltiazem (CARDIZEM LA) 360 mg 24 hr tablet Take 360 mg by mouth once daily. Every day      duloxetine (CYMBALTA) 60 MG capsule Take 60 mg by mouth once daily.        fish oil-omega-3 fatty acids 300-1,000 mg capsule Take 2 g by mouth once daily.        gabapentin (NEURONTIN) 300 MG capsule Take 600 mg by mouth 2 (two) times daily. B.I.D. and two at bedtime      garlic 1 mg Cap Take 1 tablet by mouth once daily.       hydrALAZINE (APRESOLINE) 25 MG tablet Take 25 mg by mouth 3 (three) times daily.      levothyroxine (SYNTHROID) 50 MCG tablet Take 50 mcg by mouth once daily.        linaCLOtide (LINZESS) 145 mcg Cap capsule Take 145 mcg by mouth once daily.      LORazepam (ATIVAN) 0.5 MG tablet Take 0.5 mg by mouth every 6 (six) hours as  needed for Anxiety.      metformin (GLUCOPHAGE) 500 MG tablet Take 500 mg by mouth daily with breakfast.       methocarbamoL (ROBAXIN) 750 MG Tab Take 500 mg by mouth 4 (four) times daily.      oxycodone-acetaminophen (PERCOCET)  mg per tablet Take 1 tablet by mouth daily as needed for Pain.      potassium chloride SA (K-DUR,KLOR-CON) 10 MEQ tablet Take 10 mEq by mouth 2 (two) times daily.      predniSONE (DELTASONE) 5 MG tablet 5 mg. Every day      sucralfate (CARAFATE) 100 mg/mL suspension Take 1 g by mouth 4 (four) times daily with meals and nightly.      valsartan (DIOVAN) 320 MG tablet Take 320 mg by mouth once daily.      vitamin D 1000 units Tab Take 185 mg by mouth once daily.             Discharge Procedure Orders (must include Diet, Follow-up, Activity)    Follow Up:  Follow up with PCP as previously scheduled  Resume routine diet.  Activity as tolerated.    No driving day of procedure.

## 2020-07-29 VITALS
DIASTOLIC BLOOD PRESSURE: 60 MMHG | BODY MASS INDEX: 27.8 KG/M2 | TEMPERATURE: 98 F | OXYGEN SATURATION: 98 % | SYSTOLIC BLOOD PRESSURE: 117 MMHG | HEIGHT: 66 IN | RESPIRATION RATE: 16 BRPM | WEIGHT: 173 LBS | HEART RATE: 74 BPM

## 2020-07-30 LAB
FINAL PATHOLOGIC DIAGNOSIS: NORMAL
GROSS: NORMAL

## 2022-02-23 DIAGNOSIS — D84.9 IMMUNOSUPPRESSED STATUS: ICD-10-CM

## 2022-06-28 ENCOUNTER — OFFICE VISIT (OUTPATIENT)
Dept: GASTROENTEROLOGY | Facility: CLINIC | Age: 72
End: 2022-06-28
Payer: MEDICARE

## 2022-06-28 VITALS — WEIGHT: 168.63 LBS | HEIGHT: 66 IN | BODY MASS INDEX: 27.1 KG/M2

## 2022-06-28 DIAGNOSIS — K21.9 GASTROESOPHAGEAL REFLUX DISEASE WITHOUT ESOPHAGITIS: ICD-10-CM

## 2022-06-28 DIAGNOSIS — K59.04 CHRONIC IDIOPATHIC CONSTIPATION: ICD-10-CM

## 2022-06-28 DIAGNOSIS — Z86.010 HISTORY OF COLON POLYPS: ICD-10-CM

## 2022-06-28 DIAGNOSIS — R13.10 ODYNOPHAGIA: ICD-10-CM

## 2022-06-28 DIAGNOSIS — K22.2 SCHATZKI'S RING: ICD-10-CM

## 2022-06-28 DIAGNOSIS — R13.19 ESOPHAGEAL DYSPHAGIA: Primary | ICD-10-CM

## 2022-06-28 PROCEDURE — 99214 OFFICE O/P EST MOD 30 MIN: CPT | Mod: PBBFAC,PO | Performed by: NURSE PRACTITIONER

## 2022-06-28 PROCEDURE — 99214 OFFICE O/P EST MOD 30 MIN: CPT | Mod: S$PBB,,, | Performed by: NURSE PRACTITIONER

## 2022-06-28 PROCEDURE — 99214 PR OFFICE/OUTPT VISIT, EST, LEVL IV, 30-39 MIN: ICD-10-PCS | Mod: S$PBB,,, | Performed by: NURSE PRACTITIONER

## 2022-06-28 PROCEDURE — 99999 PR PBB SHADOW E&M-EST. PATIENT-LVL IV: ICD-10-PCS | Mod: PBBFAC,,, | Performed by: NURSE PRACTITIONER

## 2022-06-28 PROCEDURE — 99999 PR PBB SHADOW E&M-EST. PATIENT-LVL IV: CPT | Mod: PBBFAC,,, | Performed by: NURSE PRACTITIONER

## 2022-06-28 RX ORDER — ACETAMINOPHEN 500 MG
2 TABLET ORAL
COMMUNITY

## 2022-06-28 RX ORDER — DICLOFENAC SODIUM 10 MG/G
GEL TOPICAL
COMMUNITY
Start: 2022-01-12

## 2022-06-28 RX ORDER — CEFUROXIME AXETIL 500 MG/1
500 TABLET ORAL 2 TIMES DAILY
COMMUNITY
Start: 2022-06-15

## 2022-06-28 RX ORDER — ZINC GLUCONATE 50 MG
50 TABLET ORAL
COMMUNITY

## 2022-06-28 RX ORDER — PLECANATIDE 3 MG/1
3 TABLET ORAL DAILY
Qty: 30 TABLET | Refills: 2 | Status: SHIPPED | OUTPATIENT
Start: 2022-06-28 | End: 2024-01-26 | Stop reason: SDUPTHER

## 2022-06-28 RX ORDER — SUCRALFATE 1 G/10ML
1 SUSPENSION ORAL
Qty: 1200 ML | Refills: 1 | Status: SHIPPED | OUTPATIENT
Start: 2022-06-28 | End: 2022-08-27

## 2022-06-28 RX ORDER — FLUTICASONE PROPIONATE 50 MCG
2 SPRAY, SUSPENSION (ML) NASAL
COMMUNITY
Start: 2022-05-19

## 2022-06-28 RX ORDER — HYDROCHLOROTHIAZIDE 25 MG/1
25 TABLET ORAL
COMMUNITY
Start: 2022-05-13

## 2022-06-28 RX ORDER — CEVIMELINE HYDROCHLORIDE 30 MG/1
30 CAPSULE ORAL
COMMUNITY
Start: 2022-05-25

## 2022-06-28 RX ORDER — PRAVASTATIN SODIUM 40 MG/1
40 TABLET ORAL
COMMUNITY
Start: 2022-03-31

## 2022-06-28 NOTE — PROGRESS NOTES
Subjective:       Patient ID: Estela Pardo is a 72 y.o. female, Body mass index is 27.22 kg/m².    Chief Complaint: Dysphagia      Established patient of Dr. Lindsey, Bee Braxtonther, NP & myself.     Gastroesophageal Reflux  She complains of abdominal pain (Rare; epigastric region), dysphagia (Chief complaint) and heartburn (Rare). She reports no belching, no chest pain, no choking, no coughing, no early satiety, no globus sensation, no hoarse voice, no nausea, no sore throat, no stridor, no tooth decay, no water brash or no wheezing. Primary symptoms also include odynophagia . This is a recurrent problem. The current episode started more than 1 month ago (Recurred couple months ago). The problem occurs frequently. The problem has been gradually worsening. The heartburn is located in the substernum. The heartburn is of moderate intensity. The heartburn does not wake her from sleep. The heartburn limits her activity. The heartburn doesn't change with position. The symptoms are aggravated by certain foods (dysphagia to solids; denies trouble swallowing liquids; esophageal dilation improved symptoms in the past). Pertinent negatives include no anemia, melena or weight loss. Risk factors include smoking/tobacco exposure and lack of exercise (former smoker). She has tried a PPI (Past: Carafate- helped; Currently: Dexilant 60 mg once daily- helps) for the symptoms. Past procedures include an abdominal ultrasound and an EGD.     Review of Systems   Constitutional: Negative for appetite change, fever, unexpected weight change and weight loss.   HENT: Positive for trouble swallowing. Negative for hoarse voice and sore throat.    Respiratory: Negative for cough, choking, shortness of breath and wheezing.    Cardiovascular: Negative for chest pain.   Gastrointestinal: Positive for abdominal pain (Rare; epigastric region), constipation (chronic problem; past meds: Linzess did not help and caused bloating and gas; current  meds: OTC stool softener plus laxative PRN helps), dysphagia (Chief complaint) and heartburn (Rare). Negative for abdominal distention, anal bleeding, blood in stool, diarrhea, melena, nausea, rectal pain and vomiting.   Genitourinary: Negative for difficulty urinating and dysuria.   Musculoskeletal: Positive for gait problem.   Skin: Negative for rash.   Neurological: Negative for speech difficulty.   Psychiatric/Behavioral: Negative for confusion.       Past Medical History:   Diagnosis Date    Anemia     history of sickle cell trait    Anticoagulant long-term use     Arthritis     rheumatoid    Chronic constipation     Colon polyp     Diabetes mellitus     medication induced    Esophageal stricture     Former smoker     Hypertension     RA (rheumatoid arthritis)     Sjoegren syndrome     Thyroid disease     hypothyroidism      Past Surgical History:   Procedure Laterality Date    CERVICAL SPINE SURGERY  08/2019    COLONOSCOPY  4/9/2012    Dr. Lindsey at [facility]; internal hemorrhoids, colon polyp removed, repeat in 5 years for surveillance or prn    COLONOSCOPY N/A 2/17/2016    Dr. Lindsey; polyps removed; repeat in 5 years; bx: tubular adenoma    COLONOSCOPY N/A 7/28/2020    Procedure: COLONOSCOPY;  Surgeon: Bartolo Lindsey MD;  Location: [facility];  Service: Endoscopy;  Laterality: N/A;    ESOPHAGOGASTRODUODENOSCOPY N/A 3/11/2019    Dr. Lindsey; mild schatzki ring- dilated; bx unremarkable    ESOPHAGOGASTRODUODENOSCOPY N/A 7/28/2020    Procedure: EGD (ESOPHAGOGASTRODUODENOSCOPY);  Surgeon: Bartolo Lindsey MD;  Location: [facility];  Service: Endoscopy;  Laterality: N/A;    EYE SURGERY      cataract     JOINT REPLACEMENT      right knee replacement    JOINT REPLACEMENT      Left knee replacement     TOE SURGERY      pin little toe right foot    TUBAL LIGATION      UPPER GASTROINTESTINAL ENDOSCOPY  2015      Family History   Problem Relation Age of Onset    Liver  cancer Sister       Wt Readings from Last 10 Encounters:   06/28/22 76.5 kg (168 lb 10.4 oz)   09/10/21 73.9 kg (163 lb)   07/27/20 78.5 kg (173 lb)   06/30/20 79.6 kg (175 lb 7.8 oz)   03/08/19 78 kg (172 lb)   02/07/18 83 kg (183 lb)   02/07/17 80.7 kg (178 lb)   06/27/16 80.3 kg (177 lb)   02/15/16 80.3 kg (177 lb)   02/11/16 80.8 kg (178 lb 2.1 oz)     Lab Results   Component Value Date    WBC 7.90 01/05/2010    HGB 12.5 01/05/2010    HCT 34.4 (L) 01/05/2010    MCV 81.1 (L) 01/05/2010     01/05/2010     CMP  Sodium   Date Value Ref Range Status   05/25/2009 143 136 - 145 mMol/l Final     Potassium   Date Value Ref Range Status   05/25/2009 4.6 3.5 - 5.1 mMol/l Final     Chloride   Date Value Ref Range Status   05/25/2009 104 95 - 110 mMol/l Final     CO2   Date Value Ref Range Status   05/25/2009 26 23.0 - 29.0 mEq/L Final     Glucose   Date Value Ref Range Status   05/25/2009 101 70 - 110 mg/dl Final     BUN   Date Value Ref Range Status   05/25/2009 21 (H) 6 - 20 mg/dl Final     Creatinine   Date Value Ref Range Status   05/25/2009 1.0 0.5 - 1.4 mg/dl Final     Calcium   Date Value Ref Range Status   05/25/2009 9.0 8.7 - 10.5 mg/dl Final     Total Protein   Date Value Ref Range Status   07/06/2020 7.0 6.0 - 8.4 g/dL Final     Albumin   Date Value Ref Range Status   07/06/2020 3.3 (L) 3.5 - 5.2 g/dL Final     Total Bilirubin   Date Value Ref Range Status   07/06/2020 0.3 0.1 - 1.0 mg/dL Final     Comment:     For infants and newborns, interpretation of results should be based  on gestational age, weight and in agreement with clinical  observations.  Premature Infant recommended reference ranges:  Up to 24 hours.............<8.0 mg/dL  Up to 48 hours............<12.0 mg/dL  3-5 days..................<15.0 mg/dL  6-29 days.................<15.0 mg/dL       Alkaline Phosphatase   Date Value Ref Range Status   07/06/2020 64 55 - 135 U/L Final     AST   Date Value Ref Range Status   07/06/2020 24 10 - 40 U/L  Final     ALT   Date Value Ref Range Status   07/06/2020 25 10 - 44 U/L Final       Lab Results   Component Value Date    LIPASE 15 07/06/2020              Reviewed prior medical records including radiology report of US of abdomen 7/6/20 & endoscopy history (see surgical history).     Objective:      Physical Exam  Constitutional:       General: She is not in acute distress.     Appearance: She is well-developed.   HENT:      Head: Normocephalic.      Right Ear: Hearing normal.      Left Ear: Hearing normal.      Nose: Nose normal.      Mouth/Throat:      Mouth: No oral lesions.      Pharynx: Uvula midline.   Eyes:      General: Lids are normal.      Conjunctiva/sclera: Conjunctivae normal.      Pupils: Pupils are equal, round, and reactive to light.   Neck:      Trachea: Trachea normal.   Cardiovascular:      Rate and Rhythm: Normal rate and regular rhythm.      Heart sounds: Normal heart sounds. No murmur heard.  Pulmonary:      Effort: Pulmonary effort is normal. No respiratory distress.      Breath sounds: Normal breath sounds. No stridor. No wheezing.   Abdominal:      General: Bowel sounds are normal. There is no distension.      Palpations: Abdomen is soft. There is no mass.      Tenderness: There is no abdominal tenderness. There is no guarding or rebound.   Musculoskeletal:         General: Normal range of motion.      Cervical back: Normal range of motion.      Comments: Ambulates with walker   Skin:     General: Skin is warm and dry.      Findings: No rash.      Comments: Non jaundiced   Neurological:      Mental Status: She is alert and oriented to person, place, and time.   Psychiatric:         Speech: Speech normal.         Behavior: Behavior normal. Behavior is cooperative.           Assessment:       1. Esophageal dysphagia    2. History of Schatzki's ring    3. Odynophagia    4. Gastroesophageal reflux disease without esophagitis    5. Chronic idiopathic constipation    6. History of colon polyps            Plan:   All diagnoses and orders for this visit:    Esophageal dysphagia & History of Schatzki's ring   - Schedule EGD with possible esophageal dilation if indicated   - Educated patient to eat smaller more frequent meals and to eat slowly and advised to eat a soft diet.   - Possible UGI/esophagram/esophageal manometry if symptoms persist    Odynophagia  - Restart: sucralfate (CARAFATE) 100 mg/mL suspension; Take 10 mLs (1 g total) by mouth 4 (four) times daily with meals and nightly.  Dispense: 1200 mL; Refill: 1  - Continue Dexilant 60 mg once daily  - Schedule EGD    Gastroesophageal reflux disease without esophagitis  - Restart: sucralfate (CARAFATE) 100 mg/mL suspension; Take 10 mLs (1 g total) by mouth 4 (four) times daily with meals and nightly.  Dispense: 1200 mL; Refill: 1  - Continue Dexilant 60 mg once daily  - Schedule EGD  - Take PPI in the morning 30 minutes before breakfast  - Recommend to avoid large meals, avoid eating within 3 hours of bedtime, elevate head of bed if nocturnal symptoms are present, smoking cessation (if current smoker), & weight loss (if overweight).   - Recommend minimize/avoid high-fat foods, chocolate, caffeine, citrus, alcohol, & tomato products.  - Advised to avoid/limit use of NSAID's, since they can cause GI upset, bleeding, and/or ulcers. If needed, take with food.     Chronic idiopathic constipation  - Start: plecanatide (TRULANCE) 3 mg Tab; Take 3 mg by mouth once daily.  Dispense: 30 tablet; Refill: 2  - Discontinue Linzess  - Recommend daily exercise as tolerated, adequate water intake, and high fiber diet.     History of colon polyps   - Next surveillance colonoscopy due 7/2025    If no improvement in symptoms or symptoms worsen, call/follow-up at clinic or go to ER

## 2022-08-30 ENCOUNTER — TELEPHONE (OUTPATIENT)
Dept: GASTROENTEROLOGY | Facility: CLINIC | Age: 72
End: 2022-08-30
Payer: MEDICARE

## 2022-08-30 NOTE — TELEPHONE ENCOUNTER
Left message for pt reminding of prep instructions & that [facility] will call in upcoming days with arrival time. Number provided.

## 2022-09-06 ENCOUNTER — HOSPITAL ENCOUNTER (OUTPATIENT)
Facility: HOSPITAL | Age: 72
Discharge: HOME | End: 2022-09-06
Attending: INTERNAL MEDICINE | Admitting: INTERNAL MEDICINE
Payer: MEDICARE

## 2022-09-06 ENCOUNTER — ANESTHESIA EVENT (OUTPATIENT)
Dept: ENDOSCOPY | Facility: HOSPITAL | Age: 72
End: 2022-09-06
Payer: MEDICARE

## 2022-09-06 ENCOUNTER — ANESTHESIA (OUTPATIENT)
Dept: ENDOSCOPY | Facility: HOSPITAL | Age: 72
End: 2022-09-06
Payer: MEDICARE

## 2022-09-06 VITALS
HEIGHT: 66 IN | SYSTOLIC BLOOD PRESSURE: 147 MMHG | DIASTOLIC BLOOD PRESSURE: 78 MMHG | HEART RATE: 65 BPM | RESPIRATION RATE: 16 BRPM | WEIGHT: 158 LBS | OXYGEN SATURATION: 96 % | BODY MASS INDEX: 25.39 KG/M2 | TEMPERATURE: 98 F

## 2022-09-06 DIAGNOSIS — R13.10 DYSPHAGIA: ICD-10-CM

## 2022-09-06 LAB — GLUCOSE SERPL-MCNC: 115 MG/DL (ref 70–110)

## 2022-09-06 PROCEDURE — 63600175 PHARM REV CODE 636 W HCPCS: Mod: PO | Performed by: NURSE ANESTHETIST, CERTIFIED REGISTERED

## 2022-09-06 PROCEDURE — 25000003 PHARM REV CODE 250: Mod: PO | Performed by: NURSE ANESTHETIST, CERTIFIED REGISTERED

## 2022-09-06 PROCEDURE — 43248 EGD GUIDE WIRE INSERTION: CPT | Mod: PO | Performed by: INTERNAL MEDICINE

## 2022-09-06 PROCEDURE — 37000008 HC ANESTHESIA 1ST 15 MINUTES: Mod: PO | Performed by: INTERNAL MEDICINE

## 2022-09-06 PROCEDURE — C1769 GUIDE WIRE: HCPCS | Mod: PO | Performed by: INTERNAL MEDICINE

## 2022-09-06 PROCEDURE — 43248 EGD GUIDE WIRE INSERTION: CPT | Mod: ,,, | Performed by: INTERNAL MEDICINE

## 2022-09-06 PROCEDURE — 37000009 HC ANESTHESIA EA ADD 15 MINS: Mod: PO | Performed by: INTERNAL MEDICINE

## 2022-09-06 PROCEDURE — D9220A PRA ANESTHESIA: ICD-10-PCS | Mod: CRNA,,, | Performed by: NURSE ANESTHETIST, CERTIFIED REGISTERED

## 2022-09-06 PROCEDURE — 43248 PR EGD, FLEX, W/DILATION OVER GUIDEWIRE: ICD-10-PCS | Mod: ,,, | Performed by: INTERNAL MEDICINE

## 2022-09-06 PROCEDURE — D9220A PRA ANESTHESIA: Mod: CRNA,,, | Performed by: NURSE ANESTHETIST, CERTIFIED REGISTERED

## 2022-09-06 PROCEDURE — D9220A PRA ANESTHESIA: Mod: ANES,,, | Performed by: ANESTHESIOLOGY

## 2022-09-06 PROCEDURE — D9220A PRA ANESTHESIA: ICD-10-PCS | Mod: ANES,,, | Performed by: ANESTHESIOLOGY

## 2022-09-06 PROCEDURE — 82962 GLUCOSE BLOOD TEST: CPT | Mod: PO | Performed by: INTERNAL MEDICINE

## 2022-09-06 PROCEDURE — 63600175 PHARM REV CODE 636 W HCPCS: Mod: PO | Performed by: INTERNAL MEDICINE

## 2022-09-06 RX ORDER — SODIUM CHLORIDE 0.9 % (FLUSH) 0.9 %
10 SYRINGE (ML) INJECTION
Status: DISCONTINUED | OUTPATIENT
Start: 2022-09-06 | End: 2022-09-06 | Stop reason: HOSPADM

## 2022-09-06 RX ORDER — PROPOFOL 10 MG/ML
VIAL (ML) INTRAVENOUS
Status: DISCONTINUED | OUTPATIENT
Start: 2022-09-06 | End: 2022-09-06

## 2022-09-06 RX ORDER — LIDOCAINE HCL/PF 100 MG/5ML
SYRINGE (ML) INTRAVENOUS
Status: DISCONTINUED | OUTPATIENT
Start: 2022-09-06 | End: 2022-09-06

## 2022-09-06 RX ORDER — SODIUM CHLORIDE, SODIUM LACTATE, POTASSIUM CHLORIDE, CALCIUM CHLORIDE 600; 310; 30; 20 MG/100ML; MG/100ML; MG/100ML; MG/100ML
INJECTION, SOLUTION INTRAVENOUS CONTINUOUS
Status: DISCONTINUED | OUTPATIENT
Start: 2022-09-06 | End: 2022-09-06 | Stop reason: HOSPADM

## 2022-09-06 RX ADMIN — PROPOFOL 30 MG: 10 INJECTION, EMULSION INTRAVENOUS at 11:09

## 2022-09-06 RX ADMIN — LIDOCAINE HYDROCHLORIDE 50 MG: 20 INJECTION, SOLUTION INTRAVENOUS at 11:09

## 2022-09-06 RX ADMIN — SODIUM CHLORIDE, SODIUM LACTATE, POTASSIUM CHLORIDE, AND CALCIUM CHLORIDE: .6; .31; .03; .02 INJECTION, SOLUTION INTRAVENOUS at 10:09

## 2022-09-06 NOTE — ANESTHESIA PREPROCEDURE EVALUATION
09/06/2022  Estela Pardo is a 72 y.o., female.    Pre-op Assessment    I have reviewed the Patient Summary Reports.    I have reviewed the Nursing Notes. I have reviewed the NPO Status.   I have reviewed the Medications.   Steroids Taken In Past Year: Prednisone    Review of Systems  Anesthesia Hx:  No problems with previous Anesthesia    Social:  Non-Smoker, No Alcohol Use    Hematology/Oncology:        Hematology Comments: Sickle cell trait   EENT/Dental:   Sjogren syndrome   Cardiovascular:   Hypertension    Pulmonary:  Pulmonary Normal    Renal/:  Renal/ Normal     Hepatic/GI:  Hepatic/GI Normal    Musculoskeletal:   Arthritis (rheumatoid arthritis)     Neurological:   Chronic Pain Syndrome   Endocrine:   Diabetes Hypothyroidism        Physical Exam  General:  Well nourished      Airway/Jaw/Neck:  Airway Findings: Mouth Opening: Normal   Tongue: Normal   General Airway Assessment: Adult Mallampati: II  Improves to II with phonation.  TM Distance: 4-6 cm   Jaw/Neck Findings:  Neck ROM: Extension Decreased, Mild       Dental:  Dental Findings: In tact     Chest/Lungs:  Chest/Lungs Findings: Clear to auscultation, Normal Respiratory Rate      Heart/Vascular:  Heart Findings: Rate: Normal  Rhythm: Regular Rhythm  Sounds: Normal  Heart murmur: negative Vascular Findings: Normal (No carotid bruits.)       Mental Status:  Mental Status Findings:  Cooperative, Alert and Oriented         Anesthesia Plan  Type of Anesthesia, risks & benefits discussed:  Anesthesia Type:  general    Patient's Preference:   Plan Factors:          Intra-op Monitoring Plan: standard ASA monitors  Intra-op Monitoring Plan Comments:   Post Op Pain Control Plan:   Post Op Pain Control Plan Comments:     Induction:   IV  Beta Blocker:  Patient is not currently on a Beta-Blocker (No further documentation required).       Informed  Consent: Informed consent signed with the Patient and all parties understand the risks and agree with anesthesia plan.  All questions answered.  Anesthesia consent signed with patient.  ASA Score: 3     Day of Surgery Review of History & Physical: I have interviewed and examined the patient. I have reviewed the patient's H&P dated:  There are no significant changes.   H&P completed by Anesthesiologist.   Anesthesia Plan Notes: Propofol general.        Ready For Surgery From Anesthesia Perspective.           Physical Exam  General: Well nourished    Airway:  Mallampati: II / II  Mouth Opening: Normal  TM Distance: 4-6 cm  Tongue: Normal  Neck ROM: Extension Decreased, Mild    Dental:  In tact    Chest/Lungs:  Clear to auscultation, Normal Respiratory Rate    Heart:  Rate: Normal  Rhythm: Regular Rhythm  Sounds: Normal          Anesthesia Plan  Type of Anesthesia, risks & benefits discussed:    Anesthesia Type: general  Intra-op Monitoring Plan: standard ASA monitors  Induction:  IV  Informed Consent: Informed consent signed with the Patient and all parties understand the risks and agree with anesthesia plan.  All questions answered.   ASA Score: 3  Day of Surgery Review of History & Physical: I have interviewed and examined the patient. I have reviewed the patient's H&P dated: H&P completed by Anesthesiologist.  Anesthesia Plan Notes: Propofol general.    Ready For Surgery From Anesthesia Perspective.       .

## 2022-09-06 NOTE — PROVATION PATIENT INSTRUCTIONS
Discharge Summary/Instructions after an Endoscopic Procedure  Patient Name: Estela Pardo  Patient MRN: [id number]  Patient YOB: 1950  Tuesday, September 6, 2022  Bartloo Lindsey MD  Dear patient,  As a result of recent federal legislation (The Federal Cures Act), you may   receive lab or pathology results from your procedure in your [facility]   account before your physician is able to contact you. Your physician or   their representative will relay the results to you with their   recommendations at their soonest availability.  Thank you,  RESTRICTIONS:  During your procedure today, you received medications for sedation.  These   medications may affect your judgment, balance and coordination.  Therefore,   for 24 hours, you have the following restrictions:   - DO NOT drive a car, operate machinery, make legal/financial decisions,   sign important papers or drink alcohol.    ACTIVITY:  Today: no heavy lifting, straining or running due to procedural   sedation/anesthesia.  The following day: return to full activity including work.  DIET:  Eat and drink normally unless instructed otherwise.     TREATMENT FOR COMMON SIDE EFFECTS:  - Mild abdominal pain, nausea, belching, bloating or excessive gas:  rest,   eat lightly and use a heating pad.  - Sore Throat: treat with throat lozenges and/or gargle with warm salt   water.  - Because air was used during the procedure, expelling large amounts of air   from your rectum or belching is normal.  - If a bowel prep was taken, you may not have a bowel movement for 1-3 days.    This is normal.  SYMPTOMS TO WATCH FOR AND REPORT TO YOUR PHYSICIAN:  1. Abdominal pain or bloating, other than gas cramps.  2. Chest pain.  3. Back pain.  4. Signs of infection such as: chills or fever occurring within 24 hours   after the procedure.  5. Rectal bleeding, which would show as bright red, maroon, or black stools.   (A tablespoon of blood from the rectum is not serious, especially  if   hemorrhoids are present.)  6. Vomiting.  7. Weakness or dizziness.  GO DIRECTLY TO THE NEAREST EMERGENCY ROOM IF YOU HAVE ANY OF THE FOLLOWING:      Difficulty breathing              Chills and/or fever over 101 F   Persistent vomiting and/or vomiting blood   Severe abdominal pain   Severe chest pain   Black, tarry stools   Bleeding- more than one tablespoon   Any other symptom or condition that you feel may need urgent attention  Your doctor recommends these additional instructions:  If any biopsies were taken, your doctors clinic will contact you in 1 to 2   weeks with any results.  Continue your present medications.   Your physician has recommended a repeat upper endoscopy as needed for   retreatment.   You are being discharged to home.  For questions, problems or results please call your physician - Bartolo Lindsey MD at Work:  [phone].  EMERGENCY PHONE NUMBER: [phone], LAB RESULTS: [phone]  IF A COMPLICATION OR EMERGENCY SITUATION ARISES AND YOU ARE UNABLE TO REACH   YOUR PHYSICIAN - GO DIRECTLY TO THE EMERGENCY ROOM.  ___________________________________________  Nurse Signature  ___________________________________________  Patient/Designated Responsible Party Signature  Bartolo Lindsey MD  9/6/2022 11:15:24 AM  This report has been verified and signed electronically.  Dear patient,  As a result of recent federal legislation (The Federal Cures Act), you may   receive lab or pathology results from your procedure in your [facility]   account before your physician is able to contact you. Your physician or   their representative will relay the results to you with their   recommendations at their soonest availability.  Thank you.  PROVATION

## 2022-09-06 NOTE — DISCHARGE SUMMARY
[facility] - Endoscopy  Discharge Note  Short Stay  Discharge Note  Short Stay      SUMMARY     Admit Date: 9/6/2022    Attending Physician: Bartolo Lindsey MD     Discharge Physician: Bartolo Lindsey MD    Discharge Date: 9/6/2022 11:16 AM    Final Diagnosis: Dysphagia, unspecified type [R13.10]    Disposition: HOME OR SELF CARE    Patient Instructions:   Current Discharge Medication List        CONTINUE these medications which have NOT CHANGED    Details   aspirin 81 mg Tab Take 1 tablet by mouth once daily. Every day      calcium citrate-vitamin D3 315-200 mg (CITRACAL+D) 315-200 mg-unit per tablet Take 1 tablet by mouth 2 (two) times daily.        clonidine (CATAPRES) 0.1 MG tablet Take 0.1 mg by mouth 2 (two) times daily.      co-enzyme Q-10 30 mg capsule Take 30 mg by mouth 3 (three) times daily.      dexlansoprazole (DEXILANT) 60 mg capsule Take 1 capsule by mouth Daily.      diltiaZEM (CARDIZEM LA) 360 mg 24 hr tablet Take 360 mg by mouth once daily. Every day      duloxetine (CYMBALTA) 60 MG capsule Take 60 mg by mouth once daily.        fish oil-omega-3 fatty acids 300-1,000 mg capsule Take 2 g by mouth once daily.        fluticasone propionate (FLONASE) 50 mcg/actuation nasal spray 2 sprays by Nasal route.      gabapentin (NEURONTIN) 300 MG capsule Take 600 mg by mouth 2 (two) times daily. B.I.D. and two at bedtime      garlic 1 mg Cap Take 1 tablet by mouth once daily.       hydrALAZINE (APRESOLINE) 25 MG tablet Take 25 mg by mouth 3 (three) times daily.      hydroCHLOROthiazide (HYDRODIURIL) 25 MG tablet Take 25 mg by mouth.      levothyroxine (SYNTHROID) 50 MCG tablet Take 50 mcg by mouth once daily.        LORazepam (ATIVAN) 0.5 MG tablet Take 0.5 mg by mouth every 6 (six) hours as needed for Anxiety.      metformin (GLUCOPHAGE) 500 MG tablet Take 500 mg by mouth daily with breakfast.       methocarbamoL (ROBAXIN) 750 MG Tab Take 500 mg by mouth 4 (four) times daily.       oxycodone-acetaminophen (PERCOCET)  mg per tablet Take 1 tablet by mouth daily as needed for Pain.      plecanatide (TRULANCE) 3 mg Tab Take 3 mg by mouth once daily.  Qty: 30 tablet, Refills: 2    Associated Diagnoses: Chronic idiopathic constipation      potassium chloride SA (K-DUR,KLOR-CON) 10 MEQ tablet Take 10 mEq by mouth 2 (two) times daily.      pravastatin (PRAVACHOL) 40 MG tablet Take 40 mg by mouth.      predniSONE (DELTASONE) 5 MG tablet 5 mg. Every day      valsartan (DIOVAN) 320 MG tablet Take 320 mg by mouth once daily.      vitamin D 1000 units Tab Take 185 mg by mouth once daily.      zinc gluconate 50 mg tablet Take 50 mg by mouth.      abatacept (ORENCIA) 125 mg/mL Syrg Inject 125 mg into the skin every 7 days.      Bifidobacterium infantis 4 mg Cap Take by mouth.      biotin 300 mcg Tab Take 1 tablet by mouth once daily.        cefUROXime (CEFTIN) 500 MG tablet Take 500 mg by mouth 2 (two) times daily.      cevimeline (EVOXAC) 30 mg capsule Take 30 mg by mouth.      diclofenac sodium (VOLTAREN) 1 % Gel Apply 4 grams to each painful joint 4 times a day      omega 3-dha-epa-fish oil 300-1,000 mg Cap Take 2 g by mouth.             Discharge Procedure Orders (must include Diet, Follow-up, Activity)    Follow Up:  Follow up with PCP as previously scheduled  Resume routine diet.  Activity as tolerated.    No driving day of procedure.

## 2022-09-06 NOTE — TRANSFER OF CARE
"Anesthesia Transfer of Care Note    Patient: Estela Pardo    Procedure(s) Performed: Procedure(s) (LRB):  EGD (ESOPHAGOGASTRODUODENOSCOPY) (N/A)    Patient location: PACU    Anesthesia Type: general    Transport from OR: Transported from OR on room air with adequate spontaneous ventilation    Post pain: adequate analgesia    Post assessment: no apparent anesthetic complications and tolerated procedure well    Post vital signs: stable    Level of consciousness: awake and alert    Nausea/Vomiting: no nausea/vomiting    Complications: none    Transfer of care protocol was followed      Last vitals:   Visit Vitals  BP (!) 172/79   Pulse (!) 56   Temp 36.8 °C (98.2 °F)   Resp 16   Ht 5' 6" (1.676 m)   Wt 71.7 kg (158 lb)   SpO2 98%   Breastfeeding No   BMI 25.50 kg/m²     "

## 2022-09-06 NOTE — H&P
History & Physical - Short Stay  Gastroenterology      SUBJECTIVE:     Procedure: EGD    Chief Complaint/Indication for Procedure: Dysphagia    PTA Medications   Medication Sig    aspirin 81 mg Tab Take 1 tablet by mouth once daily. Every day    calcium citrate-vitamin D3 315-200 mg (CITRACAL+D) 315-200 mg-unit per tablet Take 1 tablet by mouth 2 (two) times daily.      clonidine (CATAPRES) 0.1 MG tablet Take 0.1 mg by mouth 2 (two) times daily.    co-enzyme Q-10 30 mg capsule Take 30 mg by mouth 3 (three) times daily.    dexlansoprazole (DEXILANT) 60 mg capsule Take 1 capsule by mouth Daily.    diltiaZEM (CARDIZEM LA) 360 mg 24 hr tablet Take 360 mg by mouth once daily. Every day    duloxetine (CYMBALTA) 60 MG capsule Take 60 mg by mouth once daily.      fish oil-omega-3 fatty acids 300-1,000 mg capsule Take 2 g by mouth once daily.      fluticasone propionate (FLONASE) 50 mcg/actuation nasal spray 2 sprays by Nasal route.    gabapentin (NEURONTIN) 300 MG capsule Take 600 mg by mouth 2 (two) times daily. B.I.D. and two at bedtime    garlic 1 mg Cap Take 1 tablet by mouth once daily.     hydrALAZINE (APRESOLINE) 25 MG tablet Take 25 mg by mouth 3 (three) times daily.    hydroCHLOROthiazide (HYDRODIURIL) 25 MG tablet Take 25 mg by mouth.    levothyroxine (SYNTHROID) 50 MCG tablet Take 50 mcg by mouth once daily.      LORazepam (ATIVAN) 0.5 MG tablet Take 0.5 mg by mouth every 6 (six) hours as needed for Anxiety.    metformin (GLUCOPHAGE) 500 MG tablet Take 500 mg by mouth daily with breakfast.     methocarbamoL (ROBAXIN) 750 MG Tab Take 500 mg by mouth 4 (four) times daily.    oxycodone-acetaminophen (PERCOCET)  mg per tablet Take 1 tablet by mouth daily as needed for Pain.    plecanatide (TRULANCE) 3 mg Tab Take 3 mg by mouth once daily.    potassium chloride SA (K-DUR,KLOR-CON) 10 MEQ tablet Take 10 mEq by mouth 2 (two) times daily.    pravastatin (PRAVACHOL) 40 MG tablet Take 40 mg by mouth.    predniSONE  (DELTASONE) 5 MG tablet 5 mg. Every day    valsartan (DIOVAN) 320 MG tablet Take 320 mg by mouth once daily.    vitamin D 1000 units Tab Take 185 mg by mouth once daily.    zinc gluconate 50 mg tablet Take 50 mg by mouth.    abatacept (ORENCIA) 125 mg/mL Syrg Inject 125 mg into the skin every 7 days.    Bifidobacterium infantis 4 mg Cap Take by mouth.    biotin 300 mcg Tab Take 1 tablet by mouth once daily.      cefUROXime (CEFTIN) 500 MG tablet Take 500 mg by mouth 2 (two) times daily.    cevimeline (EVOXAC) 30 mg capsule Take 30 mg by mouth.    diclofenac sodium (VOLTAREN) 1 % Gel Apply 4 grams to each painful joint 4 times a day    omega 3-dha-epa-fish oil 300-1,000 mg Cap Take 2 g by mouth.       Review of patient's allergies indicates:   Allergen Reactions    Methotrexate Other (See Comments)     Other reaction(s): abscess under arms/ GI discomfort    Nsaids (non-steroidal anti-inflammatory drug) Other (See Comments)     GI upset        Past Medical History:   Diagnosis Date    Anemia     history of sickle cell trait    Anticoagulant long-term use     Arthritis     rheumatoid    Chronic constipation     Colon polyp     Diabetes mellitus     medication induced    Esophageal stricture     Former smoker     Hypertension     RA (rheumatoid arthritis)     Sjoegren syndrome     Thyroid disease     hypothyroidism     Past Surgical History:   Procedure Laterality Date    CERVICAL SPINE SURGERY  08/2019    COLONOSCOPY  4/9/2012    Dr. Lindsey at [facility] section; internal hemorrhoids, colon polyp removed, repeat in 5 years for surveillance or prn    COLONOSCOPY N/A 2/17/2016    Dr. Lindsey; polyps removed; repeat in 5 years; bx: tubular adenoma    COLONOSCOPY N/A 7/28/2020    Procedure: COLONOSCOPY;  Surgeon: Bartolo Lindsey MD;  Location: [facility];  Service: Endoscopy;  Laterality: N/A;    ESOPHAGOGASTRODUODENOSCOPY N/A 3/11/2019    Dr. Lindsey; mild schatzki ring- dilated; bx unremarkable     ESOPHAGOGASTRODUODENOSCOPY N/A 2020    Procedure: EGD (ESOPHAGOGASTRODUODENOSCOPY);  Surgeon: Bartolo Lindsey MD;  Location: [facility];  Service: Endoscopy;  Laterality: N/A;    EYE SURGERY      cataract     JOINT REPLACEMENT      right knee replacement    JOINT REPLACEMENT      Left knee replacement     TOE SURGERY      pin little toe right foot    TUBAL LIGATION      UPPER GASTROINTESTINAL ENDOSCOPY       Family History   Problem Relation Age of Onset    Liver cancer Sister      Social History     Tobacco Use    Smoking status: Former     Packs/day: 0.20     Years: 10.00     Pack years: 2.00     Types: Cigarettes     Quit date: 1985     Years since quittin.3    Smokeless tobacco: Never   Substance Use Topics    Alcohol use: No    Drug use: No     Types: Oxycodone         OBJECTIVE:     Vital Signs (Most Recent)  Temp: 98.2 °F (36.8 °C) (22 1050)  Pulse: (!) 56 (22 1050)  Resp: 16 (22 1050)  BP: (!) 155/70 (22 1050)  SpO2: 98 % (22 1050)    Physical Exam:                                                       GENERAL:  Comfortable, in no acute distress.                                 HEENT EXAM:  Nonicteric.  No adenopathy.  Oropharynx is clear.               NECK:  Supple.                                                               LUNGS:  Clear.                                                               CARDIAC:  Regular rate and rhythm.  S1, S2.  No murmur.                      ABDOMEN:  Soft, positive bowel sounds, nontender.  No hepatosplenomegaly or masses.  No rebound or guarding.                                             EXTREMITIES:  No edema.     MENTAL STATUS:  Normal, alert and oriented.      ASSESSMENT/PLAN:     Assessment: Dysphagia    Plan: EGD    Anesthesia Plan: General    ASA Grade: ASA 2 - Patient with mild systemic disease with no functional limitations    MALLAMPATI SCORE:  I (soft palate, uvula, fauces, and tonsillar pillars  visible)

## 2022-09-06 NOTE — ANESTHESIA POSTPROCEDURE EVALUATION
Anesthesia Post Evaluation    Patient: Estela Pardo    Procedure(s) Performed: Procedure(s) (LRB):  EGD (ESOPHAGOGASTRODUODENOSCOPY) (N/A)    Final Anesthesia Type: general      Patient location during evaluation: PACU  Patient participation: Yes- Able to Participate  Level of consciousness: awake and alert, oriented and awake  Post-procedure vital signs: reviewed and stable  Pain management: adequate  Airway patency: patent    PONV status at discharge: No PONV  Anesthetic complications: no      Cardiovascular status: blood pressure returned to baseline and hemodynamically stable  Respiratory status: unassisted, spontaneous ventilation and room air  Hydration status: euvolemic  Follow-up not needed.          Vitals Value Taken Time   /78 09/06/22 1139   Temp  09/06/22 1230   Pulse 65 09/06/22 1139   Resp 16 09/06/22 1139   SpO2 96 % 09/06/22 1139         Event Time   Out of Recovery 11:50:34         Pain/Kathy Score: Kathy Score: 10 (9/6/2022 11:33 AM)

## 2023-09-27 DIAGNOSIS — R10.32 LLQ ABDOMINAL PAIN: ICD-10-CM

## 2023-09-27 RX ORDER — DICYCLOMINE HYDROCHLORIDE 10 MG/1
10 CAPSULE ORAL EVERY 6 HOURS PRN
Qty: 120 CAPSULE | Refills: 0 | Status: SHIPPED | OUTPATIENT
Start: 2023-09-27 | End: 2023-10-27

## 2023-09-27 NOTE — TELEPHONE ENCOUNTER
----- Message from Lindsey De La Cruz sent at 9/27/2023  3:51 PM CDT -----  Contact: pt  Type:  RX Refill Request    Who Called: pt  Refill or New Rx:refill  RX Name and Strength:Dicyplomine HCL 10mg (unsure I have spelled it correctly)  How is the patient currently taking it? (ex. 1XDay):as directed  Is this a 30 day or 90 day RX:30  Preferred Pharmacy with phone number:  [facility] [place]  [address] [facility]  [address] [place] [facility] [address]  Phone: [phone] Fax: [phone]    Local or Mail Order:local  Ordering Provider:César  Would the patient rather a call back or a response via [facility]? Call back  Best Call Back Number:[phone]    Additional Information: Pt was on this medication some time ago and needs another script for it.  Please advise  Thanks

## 2024-01-08 ENCOUNTER — TELEPHONE (OUTPATIENT)
Dept: GASTROENTEROLOGY | Facility: CLINIC | Age: 74
End: 2024-01-08
Payer: MEDICARE

## 2024-01-08 NOTE — TELEPHONE ENCOUNTER
Returned call to the patient, patient advised that appointment she has is one of the first available appointments in clinic and she was on the wait list should a cancellation occur.  Patient verbalized understanding of this.

## 2024-01-08 NOTE — TELEPHONE ENCOUNTER
----- Message from Johanne Finley sent at 1/8/2024  1:06 PM CST -----  Contact: self  Type:  Needs Medical Advice, SOONER APPT REQUEST    Who Called: self  Symptoms (please be specific): pt would like to see dr rodriguez is possible. Pt sts she is having a lot of trouble with her throat, her stomach, and has blood in her stool.   Would the patient rather a call back or a response via [facility]? call  Best Call Back Number: [phone]    Additional Information: please advise and thank you.

## 2024-01-26 ENCOUNTER — OFFICE VISIT (OUTPATIENT)
Dept: GASTROENTEROLOGY | Facility: CLINIC | Age: 74
End: 2024-01-26
Payer: MEDICARE

## 2024-01-26 VITALS — HEIGHT: 66 IN | BODY MASS INDEX: 25.75 KG/M2 | WEIGHT: 160.25 LBS

## 2024-01-26 DIAGNOSIS — K22.2 SCHATZKI'S RING: ICD-10-CM

## 2024-01-26 DIAGNOSIS — R13.19 ESOPHAGEAL DYSPHAGIA: Primary | ICD-10-CM

## 2024-01-26 DIAGNOSIS — Z86.010 HISTORY OF COLON POLYPS: ICD-10-CM

## 2024-01-26 DIAGNOSIS — K21.9 GASTROESOPHAGEAL REFLUX DISEASE WITHOUT ESOPHAGITIS: ICD-10-CM

## 2024-01-26 DIAGNOSIS — K59.04 CHRONIC IDIOPATHIC CONSTIPATION: ICD-10-CM

## 2024-01-26 DIAGNOSIS — K62.5 ANAL BLEEDING: ICD-10-CM

## 2024-01-26 PROCEDURE — 99215 OFFICE O/P EST HI 40 MIN: CPT | Mod: PBBFAC,PO | Performed by: NURSE PRACTITIONER

## 2024-01-26 PROCEDURE — 99214 OFFICE O/P EST MOD 30 MIN: CPT | Mod: S$PBB,,, | Performed by: NURSE PRACTITIONER

## 2024-01-26 PROCEDURE — 99999 PR PBB SHADOW E&M-EST. PATIENT-LVL V: CPT | Mod: PBBFAC,,, | Performed by: NURSE PRACTITIONER

## 2024-01-26 RX ORDER — PLECANATIDE 3 MG/1
3 TABLET ORAL DAILY
Qty: 90 TABLET | Refills: 3 | Status: SHIPPED | OUTPATIENT
Start: 2024-01-26

## 2024-01-26 RX ORDER — SUCRALFATE 1 G/10ML
1 SUSPENSION ORAL 2 TIMES DAILY
Qty: 600 ML | Refills: 1 | Status: SHIPPED | OUTPATIENT
Start: 2024-01-26 | End: 2024-03-25

## 2024-01-26 NOTE — PROGRESS NOTES
Subjective:       Patient ID: Estela Pardo is a 73 y.o. female, Body mass index is 25.87 kg/m².    Chief Complaint: Dysphagia      Established patient of Dr. Lindsey & myself.    Gastroesophageal Reflux  She complains of abdominal pain (epigastric and RLQ regions; intermittent; describes as burning), dysphagia (dysphagia to solids; denies trouble swallowing liquids; esophageal dilation improved symptoms in the past) and heartburn. She reports no belching, no chest pain, no choking, no coughing, no early satiety, no globus sensation, no hoarse voice, no nausea, no sore throat, no stridor, no tooth decay, no water brash or no wheezing. Associated symptoms also include odynophagia. This is a recurrent problem. The current episode started more than 1 month ago (Recurred several months ago). The problem occurs frequently. The problem has been unchanged. The heartburn is located in the substernum and abdomen (throat). The heartburn is of moderate intensity. The heartburn does not wake her from sleep. The heartburn limits her activity. The heartburn doesn't change with position. Exacerbated by: eating. Pertinent negatives include no anemia, melena or weight loss. Risk factors include smoking/tobacco exposure (Former smoker). She has tried a PPI (Past: Carafate- helped; Currently: Dexilant 60 mg once daily- somewhat helps) for the symptoms. Past procedures include an abdominal ultrasound and an EGD.     Review of Systems   Constitutional:  Negative for appetite change, fever, unexpected weight change and weight loss.   HENT:  Positive for trouble swallowing. Negative for hoarse voice and sore throat.    Respiratory:  Negative for cough, choking, shortness of breath and wheezing.    Cardiovascular:  Negative for chest pain.   Gastrointestinal:  Positive for abdominal pain (epigastric and RLQ regions; intermittent; describes as burning), anal bleeding (reports bright red blood on tissue paper after bowel movement; occurred  x1; denies rectal bleeding since), constipation (chronic problem; taking Trulance 3 mg once daily helps; took Linzess in the past but caused bloating and gas), dysphagia (dysphagia to solids; denies trouble swallowing liquids; esophageal dilation improved symptoms in the past) and heartburn. Negative for abdominal distention, blood in stool, diarrhea, melena, nausea, rectal pain and vomiting.   Genitourinary:  Negative for difficulty urinating and dysuria.   Musculoskeletal:  Negative for gait problem.   Skin:  Negative for rash.   Neurological:  Negative for speech difficulty.   Psychiatric/Behavioral:  Negative for confusion.        Past Medical History:   Diagnosis Date    Anemia     history of sickle cell trait    Anticoagulant long-term use     Arthritis     rheumatoid    Chronic constipation     Colon polyp     Diabetes mellitus     medication induced    Esophageal stricture     Former smoker     Hypertension     RA (rheumatoid arthritis)     Sjoegren syndrome     Thyroid disease     hypothyroidism      Past Surgical History:   Procedure Laterality Date    CERVICAL SPINE SURGERY  08/2019    COLONOSCOPY  4/9/2012    Dr. Lindsey at [facility] section; internal hemorrhoids, colon polyp removed, repeat in 5 years for surveillance or prn    COLONOSCOPY N/A 2/17/2016    Dr. Lindsey; polyps removed; repeat in 5 years; bx: tubular adenoma    COLONOSCOPY N/A 7/28/2020    Procedure: COLONOSCOPY;  Surgeon: Bartolo Lindsey MD;  Location: [facility];  Service: Endoscopy;  Laterality: N/A;    ESOPHAGOGASTRODUODENOSCOPY N/A 3/11/2019    Dr. Lindsey; mild schatzki ring- dilated; bx unremarkable    ESOPHAGOGASTRODUODENOSCOPY N/A 7/28/2020    Procedure: EGD (ESOPHAGOGASTRODUODENOSCOPY);  Surgeon: Bartolo Lindsey MD;  Location: [facility];  Service: Endoscopy;  Laterality: N/A;    ESOPHAGOGASTRODUODENOSCOPY N/A 9/6/2022    Procedure: EGD (ESOPHAGOGASTRODUODENOSCOPY);  Surgeon: Bartolo Lindsey MD;  Location:  [facility] END[facility];  Service: Endoscopy;  Laterality: N/A;    EYE SURGERY      cataract     JOINT REPLACEMENT      right knee replacement    JOINT REPLACEMENT      Left knee replacement     TOE SURGERY      pin little toe right foot    TUBAL LIGATION      UPPER GASTROINTESTINAL ENDOSCOPY  2015      Family History   Problem Relation Age of Onset    Liver cancer Sister       Wt Readings from Last 10 Encounters:   01/26/24 72.7 kg (160 lb 4.4 oz)   09/01/22 71.7 kg (158 lb)   06/28/22 76.5 kg (168 lb 10.4 oz)   09/10/21 73.9 kg (163 lb)   07/27/20 78.5 kg (173 lb)   06/30/20 79.6 kg (175 lb 7.8 oz)   03/08/19 78 kg (172 lb)   02/07/18 83 kg (183 lb)   02/07/17 80.7 kg (178 lb)   06/27/16 80.3 kg (177 lb)     Lab Results   Component Value Date    WBC 7.90 01/05/2010    HGB 12.5 01/05/2010    HCT 34.4 (L) 01/05/2010    MCV 81.1 (L) 01/05/2010     01/05/2010     CMP  Sodium   Date Value Ref Range Status   11/09/2023 145 (H) 136 - 144 mmol/L Final   05/25/2009 143 136 - 145 mMol/l Final     Potassium   Date Value Ref Range Status   11/09/2023 4.2 3.6 - 5.1 mmol/L Final   05/25/2009 4.6 3.5 - 5.1 mMol/l Final     Chloride   Date Value Ref Range Status   05/25/2009 104 95 - 110 mMol/l Final     CO2   Date Value Ref Range Status   11/09/2023 30 22 - 32 mmol/L Final   05/25/2009 26 23.0 - 29.0 mEq/L Final     Glucose   Date Value Ref Range Status   05/25/2009 101 70 - 110 mg/dl Final     BUN   Date Value Ref Range Status   05/25/2009 21 (H) 6 - 20 mg/dl Final     Blood Urea Nitrogen   Date Value Ref Range Status   11/09/2023 14 8 - 20 mg/dL Final     Creatinine   Date Value Ref Range Status   11/09/2023 0.93 0.60 - 1.10 mg/dL Final   05/25/2009 1.0 0.5 - 1.4 mg/dl Final     Calcium   Date Value Ref Range Status   11/09/2023 9.8 8.9 - 10.3 mg/dL Final   05/25/2009 9.0 8.7 - 10.5 mg/dl Final     Total Protein   Date Value Ref Range Status   11/09/2023 7.0 6.1 - 7.9 g/dL Final   07/06/2020 7.0 6.0 - 8.4 g/dL Final     Albumin    Date Value Ref Range Status   11/09/2023 3.9 3.5 - 4.8 g/dL Final   07/06/2020 3.3 (L) 3.5 - 5.2 g/dL Final     Total Bilirubin   Date Value Ref Range Status   11/09/2023 0.4 0.4 - 2.0 mg/dL Final   07/06/2020 0.3 0.1 - 1.0 mg/dL Final     Comment:     For infants and newborns, interpretation of results should be based  on gestational age, weight and in agreement with clinical  observations.  Premature Infant recommended reference ranges:  Up to 24 hours.............<8.0 mg/dL  Up to 48 hours............<12.0 mg/dL  3-5 days..................<15.0 mg/dL  6-29 days.................<15.0 mg/dL       Alkaline Phosphatase   Date Value Ref Range Status   11/09/2023 56 28 - 116 U/L Final   07/06/2020 64 55 - 135 U/L Final     AST   Date Value Ref Range Status   11/09/2023 25 10 - 34 U/L Final   07/06/2020 24 10 - 40 U/L Final     ALT   Date Value Ref Range Status   11/09/2023 18 5 - 41 U/L Final   07/06/2020 25 10 - 44 U/L Final     Anion Gap   Date Value Ref Range Status   11/09/2023 10 7 - 16 mmol/L Final       Lab Results   Component Value Date    LIPASE 15 07/06/2020              Reviewed prior medical records including radiology report of X-Ray of abdomen 7/3/23 and US of abdomen 12/2/21 & endoscopy history (see surgical history).     Objective:      Physical Exam  Constitutional:       General: She is not in acute distress.     Appearance: She is well-developed.   HENT:      Head: Normocephalic.      Right Ear: Hearing normal.      Left Ear: Hearing normal.      Nose: Nose normal.      Mouth/Throat:      Mouth: No oral lesions.      Pharynx: Uvula midline.   Eyes:      General: Lids are normal.      Conjunctiva/sclera: Conjunctivae normal.      Pupils: Pupils are equal, round, and reactive to light.   Neck:      Trachea: Trachea normal.   Cardiovascular:      Rate and Rhythm: Normal rate and regular rhythm.      Heart sounds: Normal heart sounds. No murmur heard.  Pulmonary:      Effort: Pulmonary effort is  normal. No respiratory distress.      Breath sounds: Normal breath sounds. No stridor. No wheezing.   Abdominal:      General: Bowel sounds are normal. There is no distension.      Palpations: Abdomen is soft. There is no mass.      Tenderness: There is no abdominal tenderness. There is no guarding or rebound.   Musculoskeletal:         General: Normal range of motion.      Cervical back: Normal range of motion.   Skin:     General: Skin is warm and dry.      Findings: No rash.      Comments: Non jaundiced   Neurological:      Mental Status: She is alert and oriented to person, place, and time.   Psychiatric:         Speech: Speech normal.         Behavior: Behavior normal. Behavior is cooperative.           Assessment:       1. Esophageal dysphagia    2. History of Schatzki's ring    3. Gastroesophageal reflux disease without esophagitis    4. Anal bleeding    5. Chronic idiopathic constipation    6. History of colon polyps           Plan:   All diagnoses and orders for this visit:    Esophageal dysphagia & History of Schatzki's ring   - Schedule EGD with possible esophageal dilation if indicated  - Educated patient to eat smaller more frequent meals and to eat slowly and advised to eat a soft diet.  - Possible UGI/esophagram/esophageal manometry if symptoms persist     Gastroesophageal reflux disease without esophagitis  - Start: sucralfate (CARAFATE) 100 mg/mL suspension; Take 10 mLs (1 g total) by mouth 2 (two) times daily.  Dispense: 600 mL; Refill: 1  - Continue Dexilant 60 mg once daily  - Schedule EGD   - Take PPI in the morning 30 minutes before breakfast  - Recommend to avoid large meals, avoid eating within 3 hours of bedtime, elevate head of bed if nocturnal symptoms are present, smoking cessation (if current smoker), & weight loss (if overweight).   - Recommend minimize/avoid high-fat foods, chocolate, caffeine, citrus, alcohol, & tomato products.  - Advised to avoid/limit use of NSAID's, since they can  cause GI upset, bleeding, and/or ulcers. If needed, take with food.      Anal bleeding  - Discussed about different etiologies that can cause rectal bleeding, such as but not limited to diverticulosis, polyps, colon mass, colon inflammation or infection, anal fissure or hemorrhoids.   - Schedule Colonoscopy, discussed procedure with the patient, verbalized understanding         - Avoid/minimize aspirin and anti-inflammatory drugs such as ibuprofen (Advil, Motrin) and naproxen (Aleve and Naprosyn).     Chronic idiopathic constipation  - Refill and continue: plecanatide (TRULANCE) 3 mg Tab; Take 3 mg by mouth once daily.  Dispense: 90 tablet; Refill: 3  - Recommend daily exercise as tolerated, adequate water intake, and high fiber diet.   - Recommend OTC fiber supplement (Benefiber)    History of colon polyps   - Schedule Colonoscopy     If no improvement in symptoms or symptoms worsen, call/follow-up at clinic or go to ER

## 2024-01-29 ENCOUNTER — TELEPHONE (OUTPATIENT)
Dept: SURGERY | Facility: HOSPITAL | Age: 74
End: 2024-01-29
Payer: MEDICARE

## 2024-01-29 ENCOUNTER — TELEPHONE (OUTPATIENT)
Dept: GASTROENTEROLOGY | Facility: CLINIC | Age: 74
End: 2024-01-29
Payer: MEDICARE

## 2024-01-29 NOTE — TELEPHONE ENCOUNTER
----- Message from Allyn Carrizales sent at 1/29/2024  8:16 AM CST -----  Type:  Needs Medical Advice    Who Called: pt   Best Call Back Number: [phone]    Additional Information:  Requesting call back  wants to discussed sx that was supposed to be today . Pt sts she does not know the time     please advise thank you

## 2024-02-27 RX ORDER — METRONIDAZOLE 250 MG/1
250 TABLET ORAL EVERY 6 HOURS
Qty: 40 TABLET | Refills: 0 | Status: SHIPPED | OUTPATIENT
Start: 2024-02-27

## 2024-02-27 RX ORDER — TETRACYCLINE HYDROCHLORIDE 500 MG/1
500 CAPSULE ORAL EVERY 6 HOURS
Qty: 40 CAPSULE | Refills: 0 | Status: SHIPPED | OUTPATIENT
Start: 2024-02-27

## 2024-02-27 NOTE — TELEPHONE ENCOUNTER
----- Message from Piyush Perez sent at 2/27/2024 10:10 AM CST -----  Regarding: refill  Contact: patient  Type:  RX Refill Request    Who Called:  Patient   Refill or New Rx:  refill  RX Name and Strength:   Disp Refills Start End   tetracycline (ACHROMYCIN,SUMYCIN) 500 MG capsule 40 capsule 0 2/14/2024 -   Sig - Route: Take 1 capsule (500 mg total) by mouth every 6 (six) hours. - Oral   Sent to pharmacy as: tetracycline (ACHROMYCIN,SUMYCIN) 500 MG capsule   E-Prescribing Status: Receipt confirmed by pharmacy (2/14/2024  7:26 PM CST)     metroNIDAZOLE (FLAGYL) 250 MG [id number]-Sig - Route: Take 1 tablet (250 mg total) by mouth every 6 (six) hours. - OralSent to pharmacy as: metroNIDAZOLE (FLAGYL) 250 MG tabletE-Prescribing Status: Receipt confirmed by pharmacy (2/14/2024  7:26 PM CST)     How is the patient currently taking it? (ex. 1XDay):  see above   Is this a 30 day or 90 day RX:  see above   Preferred Pharmacy with phone number:      [id number] - [place], [place] - [address] [address] AT [facility] [address] [facility]  [address] [facility] [address]  Phone: [phone] Fax: [phone]    Local or Mail Order:  local  Ordering Provider:  Dr César Donahue Call Back Number:  [phone]  Additional Information:  Pt stated that she has been waiting on this med and the pharm told her they do not do short term meds. Pt would like the [facility] Thanks

## 2024-03-25 DIAGNOSIS — K21.9 GASTROESOPHAGEAL REFLUX DISEASE WITHOUT ESOPHAGITIS: ICD-10-CM

## 2024-03-25 RX ORDER — SUCRALFATE 1 G/10ML
SUSPENSION ORAL
Qty: 420 EACH | Refills: 2 | Status: SHIPPED | OUTPATIENT
Start: 2024-03-25

## 2024-12-05 ENCOUNTER — TELEPHONE (OUTPATIENT)
Dept: GASTROENTEROLOGY | Facility: CLINIC | Age: 74
End: 2024-12-05
Payer: MEDICARE

## 2024-12-05 NOTE — TELEPHONE ENCOUNTER
----- Message from Jesica sent at 12/5/2024 11:25 AM CST -----  Regarding: advice  Type:  Needs Medical Advice    Who Called: pt    Best Call Back Number: [phone]      Additional Information: pt wants a call back to discuss if she could get back on her probiotics?  please call to discuss.